# Patient Record
Sex: MALE | Race: OTHER | HISPANIC OR LATINO | Employment: FULL TIME | URBAN - METROPOLITAN AREA
[De-identification: names, ages, dates, MRNs, and addresses within clinical notes are randomized per-mention and may not be internally consistent; named-entity substitution may affect disease eponyms.]

---

## 2018-12-25 ENCOUNTER — HOSPITAL ENCOUNTER (EMERGENCY)
Facility: HOSPITAL | Age: 33
Discharge: HOME | End: 2018-12-25
Attending: EMERGENCY MEDICINE | Admitting: EMERGENCY MEDICINE

## 2018-12-25 VITALS
TEMPERATURE: 98.1 F | WEIGHT: 230 LBS | SYSTOLIC BLOOD PRESSURE: 139 MMHG | DIASTOLIC BLOOD PRESSURE: 93 MMHG | RESPIRATION RATE: 18 BRPM | HEART RATE: 88 BPM | OXYGEN SATURATION: 95 %

## 2018-12-25 DIAGNOSIS — K60.2 ANAL FISSURE: Primary | ICD-10-CM

## 2018-12-25 PROCEDURE — 99283 EMERGENCY DEPT VISIT LOW MDM: CPT

## 2018-12-25 RX ORDER — LIDOCAINE 40 MG/G
CREAM TOPICAL 3 TIMES DAILY
Qty: 30 G | Refills: 0 | Status: SHIPPED | OUTPATIENT
Start: 2018-12-25

## 2018-12-25 NOTE — ED PROVIDER NOTES
History  Chief Complaint   Patient presents with    Rectal Pain     Pt c/o rectal pain x1 month  Pt states worsening of pain x2 weeks, describing it is sharp pain  Pt states denies rectal bleeding  Pt states being constipated  29 yo male c/o rectal pain, particularly with sitting, and especially with bowel movements  Onset has been intermittently for several months, typically corresponds to hard to pass stools, and several months he was passing blood with hard stools in the underwear, on toilet paper, and dripping into toilet  Now pain has been more prominent, but bleeding has subsided for at least 1 month  He started using a fiber supplement which has improved passing stool, but still pain when sitting, and after BMs  No h/o anal trauma nor intercourse  History provided by:  Patient      None       No past medical history on file  Past Surgical History:   Procedure Laterality Date    ELBOW SURGERY Right     epicondylitis       No family history on file  I have reviewed and agree with the history as documented  Social History   Substance Use Topics    Smoking status: Current Some Day Smoker    Smokeless tobacco: Never Used      Comment: smokes hooka    Alcohol use Yes      Comment: occassional        Review of Systems   Constitutional: Negative for appetite change, chills and fever  HENT: Negative for sore throat  Respiratory: Negative for cough, shortness of breath and wheezing  Cardiovascular: Negative for chest pain and palpitations  Gastrointestinal: Positive for rectal pain  Negative for abdominal pain, diarrhea, nausea and vomiting  Genitourinary: Negative for dysuria and hematuria  Musculoskeletal: Negative for neck pain  Skin: Negative for rash  Neurological: Negative for dizziness, weakness and headaches  Psychiatric/Behavioral: Negative for suicidal ideas  All other systems reviewed and are negative        Physical Exam  Physical Exam   Constitutional: He is oriented to person, place, and time  He appears well-developed and well-nourished  HENT:   Head: Normocephalic and atraumatic  Right Ear: External ear normal    Left Ear: External ear normal    Nose: Nose normal    Mouth/Throat: Oropharynx is clear and moist    Eyes: Pupils are equal, round, and reactive to light  Conjunctivae and EOM are normal    Neck: Normal range of motion  Neck supple  Cardiovascular: Normal rate  Pulmonary/Chest: Effort normal    Abdominal: There is no tenderness  Genitourinary: Rectal exam shows fissure and tenderness  Rectal exam shows no external hemorrhoid and no internal hemorrhoid  Musculoskeletal: Normal range of motion  Neurological: He is alert and oriented to person, place, and time  No cranial nerve deficit  Coordination normal    Skin: Skin is warm and dry  Psychiatric: He has a normal mood and affect  His behavior is normal  Judgment and thought content normal    Nursing note and vitals reviewed        Vital Signs  ED Triage Vitals [12/25/18 1552]   Temp Pulse Respirations Blood Pressure SpO2   -- 88 18 139/93 95 %      Temp src Heart Rate Source Patient Position - Orthostatic VS BP Location FiO2 (%)   -- Monitor Sitting Right arm --      Pain Score       8           Vitals:    12/25/18 1552   BP: 139/93   Pulse: 88   Patient Position - Orthostatic VS: Sitting       Visual Acuity      ED Medications  Medications - No data to display    Diagnostic Studies  Results Reviewed     None                 No orders to display              Procedures  Procedures       Phone Contacts  ED Phone Contact    ED Course                               MDM  CritCare Time    Disposition  Final diagnoses:   Anal fissure     Time reflects when diagnosis was documented in both MDM as applicable and the Disposition within this note     Time User Action Codes Description Comment    12/25/2018  4:07 PM Ariella Dunham Add [K60 2] Anal fissure       ED Disposition     ED Disposition Condition Comment    Discharge  Arturo Mason discharge to home/self care  Condition at discharge: Good        Follow-up Information     Follow up With Specialties Details Why Contact Info    Jacqueline Dawn MD Colon and Rectal Surgery Go to For followup, If symptoms worsen [address]  [address]  [phone]            Patient's Medications   Discharge Prescriptions    HYDROCORTISONE (ANUSOL-HC, PROCTOSOL HC,) 2 5 % RECTAL CREAM    Apply small amount to affected area as needed for itching and pain       Start Date: 12/25/2018End Date: --       Order Dose: --       Quantity: 30 g    Refills: 0    LIDOCAINE (LMX) 4 % CREAM    Apply topically 3 (three) times a day       Start Date: 12/25/2018End Date: --       Order Dose: --       Quantity: 30 g    Refills: 0     No discharge procedures on file      ED Provider  Electronically Signed by           Yarelis Hdz MD  12/25/18 1327

## 2018-12-25 NOTE — DISCHARGE INSTRUCTIONS
Anal Fissure   WHAT YOU NEED TO KNOW:   An anal fissure is a cut or tear in the tissue inside your anus  An anal fissure may be acute or chronic  An acute anal fissure is usually small and shallow and often heals without treatment  A chronic fissure may last longer than a month and will usually require treatment  A chronic anal fissure comes back after treatment  DISCHARGE INSTRUCTIONS: Start a daily fiber supplement such as Citrucel or Metamucil and take it once or twice per day  Start a stool softener such as Colace  Drink plenty of water  The topical treatments include lidocaine and hydrocortisone and are for the pain and discomfort, but the fissure will begin to heal as your stools become easier to pass  Use tylenol and motrin, but stronger pain medicines causes constipation and will make the fissure and pain worse  Contact your healthcare provider if:   · You have a fever  · You are unable to have a bowel movement  · You have spasms in your anus that do not stop  · You have questions or concerns about your condition or care  Return to the emergency department if:   · You have very bad pain in or around your anus  · You have bleeding from your anus that does not stop

## 2019-02-15 ENCOUNTER — HOSPITAL ENCOUNTER (EMERGENCY)
Facility: HOSPITAL | Age: 34
Discharge: HOME | End: 2019-02-15

## 2019-02-15 VITALS
SYSTOLIC BLOOD PRESSURE: 176 MMHG | OXYGEN SATURATION: 100 % | TEMPERATURE: 97.8 F | WEIGHT: 230 LBS | HEART RATE: 75 BPM | RESPIRATION RATE: 18 BRPM | DIASTOLIC BLOOD PRESSURE: 85 MMHG

## 2019-02-15 DIAGNOSIS — K64.5 EXTERNAL HEMORRHOID, THROMBOSED: Primary | ICD-10-CM

## 2019-02-15 PROCEDURE — 99282 EMERGENCY DEPT VISIT SF MDM: CPT

## 2019-02-15 PROCEDURE — 96372 THER/PROPH/DIAG INJ SC/IM: CPT

## 2019-02-15 RX ORDER — DOCUSATE SODIUM 250 MG
250 CAPSULE ORAL DAILY
Qty: 10 CAPSULE | Refills: 0 | Status: SHIPPED | OUTPATIENT
Start: 2019-02-15

## 2019-02-15 RX ORDER — KETOROLAC TROMETHAMINE 30 MG/ML
15 INJECTION, SOLUTION INTRAMUSCULAR; INTRAVENOUS ONCE
Status: COMPLETED | OUTPATIENT
Start: 2019-02-15 | End: 2019-02-15

## 2019-02-15 RX ORDER — LIDOCAINE 40 MG/G
CREAM TOPICAL AS NEEDED
Qty: 30 G | Refills: 0 | Status: SHIPPED | OUTPATIENT
Start: 2019-02-15

## 2019-02-15 RX ADMIN — KETOROLAC TROMETHAMINE 15 MG: 30 INJECTION, SOLUTION INTRAMUSCULAR at 05:20

## 2019-02-15 NOTE — ED PROVIDER NOTES
History  Chief Complaint   Patient presents with    Hemorrhoids     pt reports dx with internal hemorrhoids two months ago, now pt states "i feel something there", pt reports a little bit of bright blood in toliet, normal BM     Pt is a 28year old male with a [facility] history of hemorrhoids presenting for rectal pain  Pt states the pain is 10/10 sharp pain in his anal/rectal area  He states he has been to the ED in the past for the same issue and was treated with supportive care  He states tonight he had worsening pain  He has hard irregular bowel movements that exacerbate his pain  Occasionally, he will have blood in his stool  States he has not had relief of symptomatic care at home  He denies fevers, abdominal pain, n/v/d, urinary symptoms  Prior to Admission Medications   Prescriptions Last Dose Informant Patient Reported? Taking?   hydrocortisone (ANUSOL-HC, PROCTOSOL HC,) 2 5 % rectal cream   No Yes   Sig: Apply small amount to affected area as needed for itching and pain   lidocaine (LMX) 4 % cream   No Yes   Sig: Apply topically 3 (three) times a day      Facility-Administered Medications: None       History reviewed  No pertinent past medical history  Past Surgical History:   Procedure Laterality Date    ELBOW SURGERY Right     epicondylitis       History reviewed  No pertinent family history  I have reviewed and agree with the history as documented  Social History     Tobacco Use    Smoking status: Current Some Day Smoker    Smokeless tobacco: Never Used    Tobacco comment: smokes hooka   Substance Use Topics    Alcohol use: Yes     Comment: occassional    Drug use: No        Review of Systems   Constitutional: Negative for activity change, appetite change, chills, diaphoresis, fatigue and fever  Respiratory: Negative for cough, chest tightness and shortness of breath  Cardiovascular: Negative for chest pain     Gastrointestinal: Positive for anal bleeding, blood in stool and rectal pain  Negative for abdominal distention, abdominal pain, constipation, diarrhea, nausea and vomiting  Genitourinary: Negative  Negative for decreased urine volume and difficulty urinating  Neurological: Negative for dizziness, weakness, light-headedness and headaches  Physical Exam  Physical Exam   Constitutional: He is oriented to person, place, and time  He appears well-developed and well-nourished  No distress  HENT:   Head: Normocephalic and atraumatic  Eyes: Conjunctivae and EOM are normal    Neck: Normal range of motion  Neck supple  Cardiovascular: Normal rate, regular rhythm, normal heart sounds and intact distal pulses  Pulmonary/Chest: Effort normal and breath sounds normal    Abdominal: Soft  Bowel sounds are normal  He exhibits no distension  There is no tenderness  Genitourinary: Rectal exam shows external hemorrhoid and tenderness  Rectal exam shows no internal hemorrhoid, no fissure, no mass and anal tone normal          Musculoskeletal: Normal range of motion  Neurological: He is alert and oriented to person, place, and time  Skin: Skin is warm and dry  Capillary refill takes less than 2 seconds  He is not diaphoretic         Vital Signs  ED Triage Vitals [02/15/19 0413]   Temperature Pulse Respirations Blood Pressure SpO2   97 8 °F (36 6 °C) 75 18 (!) 176/85 100 %      Temp src Heart Rate Source Patient Position - Orthostatic VS BP Location FiO2 (%)   -- -- -- -- --      Pain Score       7           Vitals:    02/15/19 0413   BP: (!) 176/85   Pulse: 75       Visual Acuity      ED Medications  Medications   ketorolac (TORADOL) injection 15 mg (15 mg Intramuscular Given 2/15/19 0520)       Diagnostic Studies  Results Reviewed     None                 No orders to display              Procedures  Procedures       Phone Contacts  ED Phone Contact    ED Course                               MDM  Number of Diagnoses or Management Options  External hemorrhoid, thrombosed:   Diagnosis management comments: Given Toradol in the ED for pain relief  Thrombosed hemorrhoid at 12 o'clock  Given supportive care at home with Colace  Follow up with colorectal surgery  Educated on return precautions  Stable and ready for surgery  Disposition  Final diagnoses:   External hemorrhoid, thrombosed     Time reflects when diagnosis was documented in both MDM as applicable and the Disposition within this note     Time User Action Codes Description Comment    2/15/2019  5:17 AM Ruth Vargasfrank Add [K64 5] External hemorrhoid, thrombosed       ED Disposition     ED Disposition Condition Date/Time Comment    Discharge Good Fri Feb 15, 2019  5:17 AM Efrain Anderson discharge to home/self care  Follow-up Information     Follow up With Specialties Details Why Contact Info    [employer]  Schedule an appointment as soon as possible for a visit  As needed [phone]            Discharge Medication List as of 2/15/2019  5:22 AM      START taking these medications    Details   docusate sodium (COLACE) 250 MG capsule Take 1 capsule (250 mg total) by mouth daily, Starting Fri 2/15/2019, Print      !! hydrocortisone (ANUSOL-HC) 2 5 % rectal cream Apply rectally 2 times daily, Print      !! lidocaine (LMX) 4 % cream Apply topically as needed for mild pain, Starting Fri 2/15/2019, Print       !! - Potential duplicate medications found  Please discuss with provider  CONTINUE these medications which have NOT CHANGED    Details   !! hydrocortisone (ANUSOL-HC, PROCTOSOL HC,) 2 5 % rectal cream Apply small amount to affected area as needed for itching and pain, Print      !! lidocaine (LMX) 4 % cream Apply topically 3 (three) times a day, Starting Tue 12/25/2018, Print       !! - Potential duplicate medications found  Please discuss with provider  No discharge procedures on file      ED Provider  Electronically Signed by           Alonso Dailey PA-C  02/15/19 8839

## 2020-10-21 ENCOUNTER — NURSE TRIAGE (OUTPATIENT)
Dept: OTHER | Facility: OTHER | Age: 35
End: 2020-10-21

## 2020-10-21 DIAGNOSIS — Z20.828 SARS-ASSOCIATED CORONAVIRUS EXPOSURE: Primary | ICD-10-CM

## 2020-10-22 ENCOUNTER — DOCUMENTATION (OUTPATIENT)
Dept: URGENT CARE | Age: 35
End: 2020-10-22

## 2020-10-22 DIAGNOSIS — Z20.828 SARS-ASSOCIATED CORONAVIRUS EXPOSURE: ICD-10-CM

## 2020-10-22 PROCEDURE — U0003 INFECTIOUS AGENT DETECTION BY NUCLEIC ACID (DNA OR RNA); SEVERE ACUTE RESPIRATORY SYNDROME CORONAVIRUS 2 (SARS-COV-2) (CORONAVIRUS DISEASE [COVID-19]), AMPLIFIED PROBE TECHNIQUE, MAKING USE OF HIGH THROUGHPUT TECHNOLOGIES AS DESCRIBED BY CMS-2020-01-R: HCPCS | Performed by: FAMILY MEDICINE

## 2020-10-23 LAB — SARS-COV-2 RNA SPEC QL NAA+PROBE: NOT DETECTED

## 2021-02-03 ENCOUNTER — NURSE TRIAGE (OUTPATIENT)
Dept: OTHER | Facility: OTHER | Age: 36
End: 2021-02-03

## 2021-02-03 DIAGNOSIS — Z20.828 EXPOSURE TO SARS-ASSOCIATED CORONAVIRUS: ICD-10-CM

## 2021-02-03 DIAGNOSIS — Z20.828 EXPOSURE TO SARS-ASSOCIATED CORONAVIRUS: Primary | ICD-10-CM

## 2021-02-03 PROCEDURE — U0003 INFECTIOUS AGENT DETECTION BY NUCLEIC ACID (DNA OR RNA); SEVERE ACUTE RESPIRATORY SYNDROME CORONAVIRUS 2 (SARS-COV-2) (CORONAVIRUS DISEASE [COVID-19]), AMPLIFIED PROBE TECHNIQUE, MAKING USE OF HIGH THROUGHPUT TECHNOLOGIES AS DESCRIBED BY CMS-2020-01-R: HCPCS | Performed by: FAMILY MEDICINE

## 2021-02-03 PROCEDURE — U0005 INFEC AGEN DETEC AMPLI PROBE: HCPCS | Performed by: FAMILY MEDICINE

## 2021-02-03 NOTE — TELEPHONE ENCOUNTER
Addendum: I closed the chart before adding the Covid swab  Had to go back in while patient was on the phone to enter the order

## 2021-02-03 NOTE — TELEPHONE ENCOUNTER
Reason for Disposition   [1] COVID-19 infection suspected by caller or triager AND [2] mild symptoms (cough, fever, or others) AND [8] no complications or SOB    Protocols used: CORONAVIRUS (COVID-19) [facility]AG[facility]

## 2021-02-03 NOTE — TELEPHONE ENCOUNTER
Regarding: COVID/SYMPTOMATIC/COUGHING  ----- Message from Haydee Puckett sent at 2/3/2021  9:25 AM EST -----  "I have partial loss of taste and smell, coughing, runny nose"

## 2021-02-03 NOTE — TELEPHONE ENCOUNTER
Patient does not have a PCP or insurance  Is not interested in PCP since he has no insurance  Is aware of the cost of the test and if he receives a bill, he will pay

## 2021-02-03 NOTE — TELEPHONE ENCOUNTER
1  Were you within 6 feet or less, for up to 15 minutes or more with a person that has a confirmed COVID-19 test? "Not that I know of "  2  What was the date of your exposure? Symptoms started 02/01/2021  3  Are you experiencing any symptoms attributed to the virus?  (Assess for SOB, cough, fever, difficulty breathing) Feels warm but no thermometer, slight, dry, infrequent cough, loss of smell and taste, Runny nose, nasal congestion, fatigue, and body aches, and diarrhea  4   HIGH RISK: Do you have any history heart or lung conditions, weakened immune system, diabetes, Asthma, CHF, HIV, COPD, Chemo, renal failure, sickle cell, etc? Dania

## 2021-02-04 ENCOUNTER — TELEPHONE (OUTPATIENT)
Dept: OTHER | Facility: OTHER | Age: 36
End: 2021-02-04

## 2021-02-04 LAB — SARS-COV-2 RNA RESP QL NAA+PROBE: POSITIVE

## 2021-02-04 NOTE — TELEPHONE ENCOUNTER
Second attempt: The patient was called for notification of a test result for COVID-19  The patient did not answer the phone and a voicemail was left requesting a call back to [phone], Option 7

## 2021-02-04 NOTE — TELEPHONE ENCOUNTER
The patient was called for notification of a test result for COVID-19  The patient did not answer the phone and a voicemail was left requesting a call back to [phone], Option 7

## 2021-02-04 NOTE — TELEPHONE ENCOUNTER
The patient was called for notification of a test result for COVID-19  The patient did not answer the phone and a voicemail was left requesting a call back to [phone], Option 7

## 2021-08-25 ENCOUNTER — OFFICE VISIT (OUTPATIENT)
Dept: GASTROENTEROLOGY | Facility: CLINIC | Age: 36
End: 2021-08-25

## 2021-08-25 VITALS
SYSTOLIC BLOOD PRESSURE: 127 MMHG | DIASTOLIC BLOOD PRESSURE: 80 MMHG | TEMPERATURE: 98.1 F | HEART RATE: 71 BPM | WEIGHT: 251 LBS

## 2021-08-25 DIAGNOSIS — K62.5 RECTAL BLEEDING: Primary | ICD-10-CM

## 2021-08-25 DIAGNOSIS — K59.09 OTHER CONSTIPATION: ICD-10-CM

## 2021-08-25 PROCEDURE — 99244 OFF/OP CNSLTJ NEW/EST MOD 40: CPT | Performed by: INTERNAL MEDICINE

## 2021-08-25 RX ORDER — POLYETHYLENE GLYCOL 3350 17 G/17G
255 POWDER, FOR SOLUTION ORAL ONCE
Qty: 255 G | Refills: 0 | Status: SHIPPED | OUTPATIENT
Start: 2021-08-25 | End: 2021-08-25

## 2021-08-25 NOTE — ASSESSMENT & PLAN NOTE
Strains to pass stools  Was taking Metamucil which was causing abdominal bloating  Likely exacerbating outlet bleeding as noted above    · Plan for colonoscopy as noted above  · Advised patient to discontinue Metamucil  · Start Benefiber which he can purchase over-the-counter with goal of 25-35 g of fiber daily   · MiraLax daily as needed   · Encouraged oral hydration

## 2021-08-25 NOTE — PATIENT INSTRUCTIONS
Stop Metamucil  Start taking Benefiber which can be purchased over-the-counter  Make sure to drink plenty of water each day for the constipation  You can also take MiraLax daily as needed for the constipation  We will schedule you for a colonoscopy  Make sure to do the bowel prep as directed      Colon on 9/23/21 with Dr Emily Andrew at [facility]  Miralax/dulcolax prep instructions given by Dairmilli Scarce

## 2021-08-25 NOTE — PROGRESS NOTES
[facility] - Outpatient Consultation  Bernadette Rivera 39 y o  male MRN: [id number]  Encounter: [id number]      ASSESSMENT AND PLAN:      Problem List Items Addressed This Visit        Digestive    Rectal bleeding - Primary     Likely outlet bleeding based on description probably hemorrhoidal   However could not definitively rule out other source of possible bleeding including diverticular versus neoplasm versus AVM  · Optimize management of constipation as noted below   · Plan for colonoscopy for further evaluation  · Bowel prep instructions provided as well as order for bowel prep         Relevant Medications    polyethylene glycol (MiraLax) 17 GM/SCOOP powder    bisacodyl (DULCOLAX) 5 mg EC tablet    Other Relevant Orders    Colonoscopy       Other    Other constipation     Strains to pass stools  Was taking Metamucil which was causing abdominal bloating  Likely exacerbating outlet bleeding as noted above  · Plan for colonoscopy as noted above  · Advised patient to discontinue Metamucil  · Start Benefiber which he can purchase over-the-counter with goal of 25-35 g of fiber daily   · MiraLax daily as needed   · Encouraged oral hydration          Relevant Medications    polyethylene glycol (MiraLax) 17 GM/SCOOP powder    bisacodyl (DULCOLAX) 5 mg EC tablet    Other Relevant Orders    Colonoscopy        ______________________________________________________________________    HPI:  79-year-old male presents to GI office for evaluation of rectal bleeding  Reports this is been going on intermittently for the last 2 years  Reports that he has episodes approximately 2 times per week  Describes it as bright red blood within the toilet bowl, mixed in with the stools  Sometimes with visible blood clot  He also sees it on upon this would tissue upon wiping  Denies any abdominal pain  He does have some constipation and reports straining to pass stools    He does take Metamucil which has been causing some bloating as well  Denies any other alarm symptoms including unintentional weight loss, diarrhea, change in bowel habits, abdominal pain  He denies any upper GI symptoms including dysphagia, odynophagia, nausea, vomiting  He has never had EGD or colonoscopy  Denies frequent NSAID use  Denies any family history of colon cancer  REVIEW OF SYSTEMS:    CONSTITUTIONAL: Denies any fever, chills, rigors, and weight loss  HEENT: No earache or tinnitus, denies hearing loss or visual disturbances  CARDIOVASCULAR: No chest pain or palpitations  RESPIRATORY: Denies any cough, hemoptysis, shortness of breath or dyspnea on exertion  GASTROINTESTINAL: As noted in the History of Present Illness  GENITOURINARY: No problems with urination, denies any hematuria or dysuria  NEUROLOGIC: No dizziness or vertigo, denies headaches   MUSCULOSKELETAL: Denies any muscle or joint pain   SKIN: Denies skin rashes or itching  ENDOCRINE: Denies excessive thirst, denies intolerance to heat or cold  PSYCHOSOCIAL: Denies depression or anxiety, denies any recent memory loss     Historical Information   History reviewed  No pertinent past medical history  Past Surgical History:   Procedure Laterality Date    ELBOW SURGERY Right     epicondylitis     Social History   Social History     Substance and Sexual Activity   Alcohol Use Yes    Comment: occassional     Social History     Substance and Sexual Activity   Drug Use No     Social History     Tobacco Use   Smoking Status Current Some Day Smoker   Smokeless Tobacco Never Used   Tobacco Comment    smokes hooka     History reviewed  No pertinent family history  Meds/Allergies   (Not in a hospital admission)    No current facility-administered medications for this visit  No Known Allergies      PHYSICAL EXAM:      Objective   Blood pressure 127/80, pulse 71, temperature 98 1 °F (36 7 °C), temperature source Tympanic, weight 114 kg (251 lb)   There is no height or weight on file to calculate BMI  General Appearance:   Alert, cooperative, no distress   HEENT:   Normocephalic, atraumatic, anicteric     Neck:   Supple, symmetrical, trachea midline   Lungs:   Clear to auscultation bilaterally; no rales, rhonchi or wheezing; respirations unlabored    Heart:   Regular rate and rhythm   Abdomen:   Soft, non-tender, non-distended; normal bowel sounds; no masses, no organomegaly    Genitalia:   Deferred    Rectal:   Deferred    Extremities:   No cyanosis, clubbing or edema    Pulses:   2+ and symmetric all extremities    Skin:   No jaundice, rashes, or lesions    Lymph Nodes:   No palpable cervical lymphadenopathy        LAB RESULTS:     No visits with results within 1 Day(s) from this visit  Latest known visit with results is:   Orders Only on 02/03/2021   Component Date Value    SARS-CoV-2 02/03/2021 Positive*       RADIOLOGY RESULTS: I have personally reviewed pertinent imaging studies  AARON Comer  Gastroenterology Fellow  [facility]  Available on [employer] Like  [email or web address]

## 2021-08-25 NOTE — ASSESSMENT & PLAN NOTE
Likely outlet bleeding based on description probably hemorrhoidal   However could not definitively rule out other source of possible bleeding including diverticular versus neoplasm versus AVM    · Optimize management of constipation as noted below   · Plan for colonoscopy for further evaluation  · Bowel prep instructions provided as well as order for bowel prep

## 2021-09-22 ENCOUNTER — TELEPHONE (OUTPATIENT)
Dept: GASTROENTEROLOGY | Facility: HOSPITAL | Age: 36
End: 2021-09-22

## 2023-03-11 ENCOUNTER — APPOINTMENT (EMERGENCY)
Dept: CT IMAGING | Facility: HOSPITAL | Age: 38
End: 2023-03-11

## 2023-03-11 ENCOUNTER — HOSPITAL ENCOUNTER (EMERGENCY)
Facility: HOSPITAL | Age: 38
Discharge: HOME | End: 2023-03-11
Attending: EMERGENCY MEDICINE

## 2023-03-11 VITALS
SYSTOLIC BLOOD PRESSURE: 139 MMHG | HEIGHT: 69 IN | OXYGEN SATURATION: 96 % | RESPIRATION RATE: 19 BRPM | WEIGHT: 256.84 LBS | DIASTOLIC BLOOD PRESSURE: 95 MMHG | HEART RATE: 73 BPM | BODY MASS INDEX: 38.04 KG/M2 | TEMPERATURE: 98.4 F

## 2023-03-11 DIAGNOSIS — R73.9 HYPERGLYCEMIA: ICD-10-CM

## 2023-03-11 DIAGNOSIS — R42 DIZZINESS: ICD-10-CM

## 2023-03-11 DIAGNOSIS — K76.0 FATTY LIVER: ICD-10-CM

## 2023-03-11 DIAGNOSIS — K42.9 UMBILICAL HERNIA: Primary | ICD-10-CM

## 2023-03-11 DIAGNOSIS — R10.9 ABDOMINAL PAIN: ICD-10-CM

## 2023-03-11 DIAGNOSIS — R55 SYNCOPE: ICD-10-CM

## 2023-03-11 DIAGNOSIS — R93.5 ABNORMAL CT OF THE ABDOMEN: ICD-10-CM

## 2023-03-11 LAB
ALBUMIN SERPL BCP-MCNC: 4.8 G/DL (ref 3.5–5)
ALP SERPL-CCNC: 86 U/L (ref 34–104)
ALT SERPL W P-5'-P-CCNC: 82 U/L (ref 7–52)
AMORPH URATE CRY URNS QL MICRO: ABNORMAL /HPF
ANION GAP SERPL CALCULATED.3IONS-SCNC: 8 MMOL/L (ref 4–13)
APTT PPP: 29 SECONDS (ref 23–37)
AST SERPL W P-5'-P-CCNC: 51 U/L (ref 13–39)
BACTERIA UR QL AUTO: ABNORMAL /HPF
BASOPHILS # BLD AUTO: 0.07 THOUSANDS/ÂΜL (ref 0–0.1)
BASOPHILS NFR BLD AUTO: 1 % (ref 0–1)
BILIRUB SERPL-MCNC: 0.76 MG/DL (ref 0.2–1)
BILIRUB UR QL STRIP: NEGATIVE
BUN SERPL-MCNC: 9 MG/DL (ref 5–25)
CALCIUM SERPL-MCNC: 10.1 MG/DL (ref 8.4–10.2)
CARDIAC TROPONIN I PNL SERPL HS: 2 NG/L
CHLORIDE SERPL-SCNC: 102 MMOL/L (ref 96–108)
CLARITY UR: CLEAR
CO2 SERPL-SCNC: 25 MMOL/L (ref 21–32)
COLOR UR: YELLOW
CREAT SERPL-MCNC: 0.95 MG/DL (ref 0.6–1.3)
EOSINOPHIL # BLD AUTO: 0.19 THOUSAND/ÂΜL (ref 0–0.61)
EOSINOPHIL NFR BLD AUTO: 2 % (ref 0–6)
ERYTHROCYTE [DISTWIDTH] IN BLOOD BY AUTOMATED COUNT: 13 % (ref 11.6–15.1)
EST. AVERAGE GLUCOSE BLD GHB EST-MCNC: 237 MG/DL
GFR SERPL CREATININE-BSD FRML MDRD: 101 ML/MIN/1.73SQ M
GLUCOSE SERPL-MCNC: 230 MG/DL (ref 65–140)
GLUCOSE UR STRIP-MCNC: NEGATIVE MG/DL
HBA1C MFR BLD: 9.9 %
HCT VFR BLD AUTO: 49.2 % (ref 36.5–49.3)
HGB BLD-MCNC: 16.3 G/DL (ref 12–17)
HGB UR QL STRIP.AUTO: NEGATIVE
IMM GRANULOCYTES # BLD AUTO: 0.04 THOUSAND/UL (ref 0–0.2)
IMM GRANULOCYTES NFR BLD AUTO: 0 % (ref 0–2)
INR PPP: 1.11 (ref 0.84–1.19)
KETONES UR STRIP-MCNC: NEGATIVE MG/DL
LEUKOCYTE ESTERASE UR QL STRIP: NEGATIVE
LIPASE SERPL-CCNC: 11 U/L (ref 11–82)
LYMPHOCYTES # BLD AUTO: 2.06 THOUSANDS/ÂΜL (ref 0.6–4.47)
LYMPHOCYTES NFR BLD AUTO: 17 % (ref 14–44)
MCH RBC QN AUTO: 30 PG (ref 26.8–34.3)
MCHC RBC AUTO-ENTMCNC: 33.1 G/DL (ref 31.4–37.4)
MCV RBC AUTO: 90 FL (ref 82–98)
MONOCYTES # BLD AUTO: 0.83 THOUSAND/ÂΜL (ref 0.17–1.22)
MONOCYTES NFR BLD AUTO: 7 % (ref 4–12)
NEUTROPHILS # BLD AUTO: 8.76 THOUSANDS/ÂΜL (ref 1.85–7.62)
NEUTS SEG NFR BLD AUTO: 73 % (ref 43–75)
NITRITE UR QL STRIP: NEGATIVE
NON-SQ EPI CELLS URNS QL MICRO: ABNORMAL /HPF
NRBC BLD AUTO-RTO: 0 /100 WBCS
PH UR STRIP.AUTO: 5.5 [PH] (ref 4.5–8)
PLATELET # BLD AUTO: 238 THOUSANDS/UL (ref 149–390)
PMV BLD AUTO: 9 FL (ref 8.9–12.7)
POTASSIUM SERPL-SCNC: 4.3 MMOL/L (ref 3.5–5.3)
PROT SERPL-MCNC: 8 G/DL (ref 6.4–8.4)
PROT UR STRIP-MCNC: ABNORMAL MG/DL
PROTHROMBIN TIME: 14.3 SECONDS (ref 11.6–14.5)
RBC # BLD AUTO: 5.44 MILLION/UL (ref 3.88–5.62)
RBC #/AREA URNS AUTO: ABNORMAL /HPF
SODIUM SERPL-SCNC: 135 MMOL/L (ref 135–147)
SP GR UR STRIP.AUTO: >=1.03 (ref 1–1.03)
UROBILINOGEN UR QL STRIP.AUTO: 0.2 E.U./DL
WBC # BLD AUTO: 11.95 THOUSAND/UL (ref 4.31–10.16)
WBC #/AREA URNS AUTO: ABNORMAL /HPF

## 2023-03-11 RX ORDER — MECLIZINE HYDROCHLORIDE 25 MG/1
25 TABLET ORAL ONCE
Status: COMPLETED | OUTPATIENT
Start: 2023-03-11 | End: 2023-03-11

## 2023-03-11 RX ORDER — MECLIZINE HYDROCHLORIDE 25 MG/1
25 TABLET ORAL 3 TIMES DAILY PRN
Qty: 30 TABLET | Refills: 0 | Status: SHIPPED | OUTPATIENT
Start: 2023-03-11

## 2023-03-11 RX ADMIN — MECLIZINE HYDROCHLORIDE 25 MG: 25 TABLET ORAL at 11:31

## 2023-03-11 RX ADMIN — SODIUM CHLORIDE 1000 ML: 0.9 INJECTION, SOLUTION INTRAVENOUS at 11:27

## 2023-03-11 RX ADMIN — IOHEXOL 100 ML: 350 INJECTION, SOLUTION INTRAVENOUS at 12:22

## 2023-03-11 NOTE — DISCHARGE INSTRUCTIONS
FU with the family doctor for eval of your elevated glucose - you may be diabetic  - reduce sugar/starches in your diet  FU with surgery for the umbilical hernia  If you develop severe pain return to the ED for re evaluation of your appendix  Meclizine as needed for dizziness

## 2023-03-11 NOTE — ED PROVIDER NOTES
History  Chief Complaint   Patient presents with   • Abdominal Pain     C/o RLQ pain ongoing for months  Denies n/v/d  Also c/o dizziness worse when he stands from sitting position  Patient presents emergency department with bilateral lower abdominal pain that has been intermittent for several months  He also has umbilical that he states he has a hard time reducing  Patient states he also has been having some intermittent dizziness with movement  He states is positional in nature  He states that it can happen when he rolls over in bed or turns his head and also can happen if he stands up and moves  He states if he is sitting or lying still he does not have the dizziness  He describes it as a brief room spinning dizziness  Patient ultimately came into the emergency department because yesterday he had gotten up and started walking and became very dizzy and then he had a syncopal episode and fell to the ground  No headache or head injury  No chest pain or trouble breathing  Prior to Admission Medications   Prescriptions Last Dose Informant Patient Reported?  Taking?   bisacodyl (DULCOLAX) 5 mg EC tablet   No No   Sig: Take 4 tablets (20 mg total) by mouth once for 1 dose   docusate sodium (COLACE) 250 MG capsule   No No   Sig: Take 1 capsule (250 mg total) by mouth daily   Patient not taking: Reported on 8/25/2021   hydrocortisone (ANUSOL-HC) 2 5 % rectal cream   No No   Sig: Apply rectally 2 times daily   Patient not taking: Reported on 8/25/2021   hydrocortisone (ANUSOL-HC, PROCTOSOL HC,) 2 5 % rectal cream   No No   Sig: Apply small amount to affected area as needed for itching and pain   Patient not taking: Reported on 8/25/2021   lidocaine (LMX) 4 % cream   No No   Sig: Apply topically 3 (three) times a day   Patient not taking: Reported on 8/25/2021   lidocaine (LMX) 4 % cream   No No   Sig: Apply topically as needed for mild pain   Patient not taking: Reported on 8/25/2021   polyethylene glycol (MiraLax) 17 GM/SCOOP powder   No No   Sig: Take 255 g by mouth once for 1 dose      Facility-Administered Medications: None       History reviewed  No pertinent past medical history  Past Surgical History:   Procedure Laterality Date   • ELBOW SURGERY Right     epicondylitis       History reviewed  No pertinent family history  I have reviewed and agree with the history as documented  E-Cigarette/Vaping   • E-Cigarette Use Never User      E-Cigarette/Vaping Substances   • Nicotine No    • THC No    • CBD No    • Flavoring No    • Other No    • Unknown No      Social History     Tobacco Use   • Smoking status: Some Days   • Smokeless tobacco: Never   • Tobacco comments:     smokes hooka   Vaping Use   • Vaping Use: Never used   Substance Use Topics   • Alcohol use: Yes     Comment: occassional   • Drug use: No       Review of Systems   Constitutional: Negative for chills and fever  Respiratory: Negative  Cardiovascular: Negative  Gastrointestinal: Positive for abdominal pain and blood in stool  Negative for diarrhea, nausea and vomiting  Genitourinary: Positive for frequency  Musculoskeletal: Positive for back pain  Neurological: Positive for dizziness, syncope and light-headedness  Negative for seizures, weakness and numbness  All other systems reviewed and are negative  Physical Exam  Physical Exam  Vitals and nursing note reviewed  Constitutional:       Appearance: He is well-developed  HENT:      Head: Normocephalic and atraumatic  Right Ear: Tympanic membrane, ear canal and external ear normal       Left Ear: Tympanic membrane, ear canal and external ear normal    Eyes:      Conjunctiva/sclera: Conjunctivae normal    Cardiovascular:      Rate and Rhythm: Normal rate and regular rhythm  Heart sounds: Normal heart sounds  Pulmonary:      Effort: Pulmonary effort is normal       Breath sounds: Normal breath sounds     Abdominal:      General: Bowel sounds are normal       Palpations: Abdomen is soft  Tenderness: There is abdominal tenderness in the right lower quadrant, periumbilical area, suprapubic area and left lower quadrant  There is no right CVA tenderness or left CVA tenderness  Comments: Patient has an umbilical hernia hernia has pain with palpation and difficult to reduce   Musculoskeletal:         General: Normal range of motion  Cervical back: Normal range of motion and neck supple  Lymphadenopathy:      Cervical: No cervical adenopathy  Skin:     General: Skin is warm  Findings: No rash  Neurological:      Mental Status: He is alert and oriented to person, place, and time  Cranial Nerves: No cranial nerve deficit  Sensory: Sensation is intact  Motor: No weakness or abnormal muscle tone  Coordination: Romberg sign negative   Coordination normal  Finger-Nose-Finger Test normal       Comments: Patient   Psychiatric:         Behavior: Behavior normal          Vital Signs  ED Triage Vitals [03/11/23 1054]   Temperature Pulse Respirations Blood Pressure SpO2   98 4 °F (36 9 °C) 99 19 128/82 96 %      Temp Source Heart Rate Source Patient Position - Orthostatic VS BP Location FiO2 (%)   Oral Monitor Sitting Right arm --      Pain Score       4           Vitals:    03/11/23 1131 03/11/23 1133 03/11/23 1135 03/11/23 1415   BP: 132/95 130/87 139/95    Pulse: 85 87 98 73   Patient Position - Orthostatic VS: Lying - Orthostatic VS Sitting - Orthostatic VS Standing - Orthostatic VS          Visual Acuity      ED Medications  Medications   sodium chloride 0 9 % bolus 1,000 mL (0 mL Intravenous Stopped 3/11/23 1227)   meclizine (ANTIVERT) tablet 25 mg (25 mg Oral Given 3/11/23 1131)   iohexol (OMNIPAQUE) 350 MG/ML injection (SINGLE-DOSE) 100 mL (100 mL Intravenous Given 3/11/23 1222)       Diagnostic Studies  Results Reviewed     Procedure Component Value Units Date/Time    Hemoglobin A1C [[id number]] Collected: 03/11/23 1129 Lab Status:  In process Specimen: Blood from Arm, Right Updated: 03/11/23 1306    Urine Microscopic [[id number]]  (Abnormal) Collected: 03/11/23 1142    Lab Status: Final result Specimen: Urine, Clean Catch Updated: 03/11/23 1235     RBC, UA None Seen /hpf      WBC, UA None Seen /hpf      Epithelial Cells Occasional /hpf      Bacteria, UA       Field obscured, unable to enumerate     /hpf     AMORPH URATES Innumerable /hpf     HS Troponin 0hr (reflex protocol) [[id number]]  (Normal) Collected: 03/11/23 1129    Lab Status: Final result Specimen: Blood from Arm, Right Updated: 03/11/23 1211     hs TnI 0hr 2 ng/L     Comprehensive metabolic panel [[id number]]  (Abnormal) Collected: 03/11/23 1129    Lab Status: Final result Specimen: Blood from Arm, Right Updated: 03/11/23 1202     Sodium 135 mmol/L      Potassium 4 3 mmol/L      Chloride 102 mmol/L      CO2 25 mmol/L      ANION GAP 8 mmol/L      BUN 9 mg/dL      Creatinine 0 95 mg/dL      Glucose 230 mg/dL      Calcium 10 1 mg/dL      AST 51 U/L      ALT 82 U/L      Alkaline Phosphatase 86 U/L      Total Protein 8 0 g/dL      Albumin 4 8 g/dL      Total Bilirubin 0 76 mg/dL      eGFR 101 ml/min/1 73sq m     Narrative:      [facility] guidelines for Chronic Kidney Disease (CKD):   •  Stage 1 with normal or high GFR (GFR > 90 mL/min/1 73 square meters)  •  Stage 2 Mild CKD (GFR = 60-89 mL/min/1 73 square meters)  •  Stage 3A Moderate CKD (GFR = 45-59 mL/min/1 73 square meters)  •  Stage 3B Moderate CKD (GFR = 30-44 mL/min/1 73 square meters)  •  Stage 4 Severe CKD (GFR = 15-29 mL/min/1 73 square meters)  •  Stage 5 End Stage CKD (GFR <15 mL/min/1 73 square meters)  Note: GFR calculation is accurate only with a steady state creatinine    Lipase [[id number]]  (Normal) Collected: 03/11/23 1129    Lab Status: Final result Specimen: Blood from Arm, Right Updated: 03/11/23 1202     Lipase 11 u/L     Protime-INR [[id number]]  (Normal) Collected: 03/11/23 1129 Lab Status: Final result Specimen: Blood from Arm, Right Updated: 03/11/23 1153     Protime 14 3 seconds      INR 1 11    APTT [[id number]]  (Normal) Collected: 03/11/23 1129    Lab Status: Final result Specimen: Blood from Arm, Right Updated: 03/11/23 1153     PTT 29 seconds     Chlamydia/GC amplified DNA by PCR [[id number]] Collected: 03/11/23 1138    Lab Status: In process Specimen: Urine, Other Updated: 03/11/23 1148    Urine Macroscopic, POC [[id number]]  (Abnormal) Collected: 03/11/23 1142    Lab Status: Final result Specimen: Urine Updated: 03/11/23 1143     Color, UA Yellow     Clarity, UA Clear     pH, UA 5 5     Leukocytes, UA Negative     Nitrite, UA Negative     Protein,  (2+) mg/dl      Glucose, UA Negative mg/dl      Ketones, UA Negative mg/dl      Urobilinogen, UA 0 2 E U /dl      Bilirubin, UA Negative     Occult Blood, UA Negative     Specific Gravity, UA >=1 030    Narrative:      CLINITEK RESULT    CBC and differential [[id number]]  (Abnormal) Collected: 03/11/23 1129    Lab Status: Final result Specimen: Blood from Arm, Right Updated: 03/11/23 1134     WBC 11 95 Thousand/uL      RBC 5 44 Million/uL      Hemoglobin 16 3 g/dL      Hematocrit 49 2 %      MCV 90 fL      MCH 30 0 pg      MCHC 33 1 g/dL      RDW 13 0 %      MPV 9 0 fL      Platelets 603 Thousands/uL      nRBC 0 /100 WBCs      Neutrophils Relative 73 %      Immat GRANS % 0 %      Lymphocytes Relative 17 %      Monocytes Relative 7 %      Eosinophils Relative 2 %      Basophils Relative 1 %      Neutrophils Absolute 8 76 Thousands/µL      Immature Grans Absolute 0 04 Thousand/uL      Lymphocytes Absolute 2 06 Thousands/µL      Monocytes Absolute 0 83 Thousand/µL      Eosinophils Absolute 0 19 Thousand/µL      Basophils Absolute 0 07 Thousands/µL                  CT abdomen pelvis with contrast   Final Result by Velasquez Gregory MD (03/11 1257)      Prominent periumbilical hernia of fat  Fatty infiltration of liver        Left colon diverticulosis without CT evidence of diverticulitis  The appendix is borderline in caliber measuring 7 mm (axial image 147) however no surrounding inflammatory change is identified  This finding should be correlated with any significant right lower quadrant tenderness as early appendicitis is difficult to entirely exclude given the thickening  Interval follow-up may be helpful if symptoms should persist or worsen  This was discussed with REBECA Santos at 12:50 PM         Workstation performed: [id number]                    Procedures  ECG 12 Lead Documentation Only    Date/Time: 3/11/2023 11:45 AM  Performed by: Javi Seymour PA-C  Authorized by: Javi Seymour PA-C     Indications / Diagnosis:  Dizziness  ECG reviewed by me, the ED Provider: yes    Patient location:  ED  Previous ECG:     Previous ECG:  Compared to current    Comparison ECG info:  April 29, 2011    Similarity:  Changes noted    Comparison to cardiac monitor: Yes (no arrythmia seen while in ED)    Rate:     ECG rate:  81    ECG rate assessment: normal    Rhythm:     Rhythm: sinus rhythm    Ectopy:     Ectopy: none    QRS:     QRS axis:  Normal  Conduction:     Conduction: normal    ST segments:     ST segments:  Normal  T waves:     T waves: inverted      Inverted:  III  Comments:      discussed EKG and H+P with DR WEBER'S Penning[place]              ED Course  ED Course as of 03/11/23 1516   Sat Mar 11, 2023   1136 WBC(!): 11 95  Min  Elivated  No anemia - pt reports Hemorid and sometimes seeing blood - not causing anemia    1215 hs TnI 0hr: 2  negative   1220 Glucose, Random(!): 230  Elevated    1220 AST(!): 51   1220 ALT(!): 82  Mildly elevated - no old to compare to    1225 Compared labs 2016- [facility] - CMP glucose - not fasting was 144, LFTs all with in normal range  1248 Had lengthy discussion with patient and family about his elevated sugar  Patient has no history of being diabetic    He admits that he is having a lot of sugary beverages  Discussed extensively about diet and exercise  7566 + umbilical hernia - enlarged fatty liver,   Mildly thickened appendix  - discussed with radiology - will reach out to surgery for eval    1304 Discussed with Surgery - they also reviewed CT and will come see pt  Discussed Ct results with pt     1443 Surgery saw pt - ok to dc home and FU as out pt for further eval of the hernia, feel this is what is causing his int pain and feel his appendix is normal               HEART Risk Score    Flowsheet Row Most Recent Value   Heart Score Risk Calculator    History 0 Filed at: 03/11/2023 1215   ECG 1 Filed at: 03/11/2023 1215   Age 0 Filed at: 03/11/2023 1215   Risk Factors 1 Filed at: 03/11/2023 1215   Troponin 0 Filed at: 03/11/2023 1215   HEART Score 2 Filed at: 03/11/2023 1215                        SBIRT 20yo+    Flowsheet Row Most Recent Value   SBIRT (23 yo +)    In order to provide better care to our patients, we are screening all of our patients for alcohol and drug use  Would it be okay to ask you these screening questions? No Filed at: 03/11/2023 1138                    Medical Decision Making  + dizziness - suspect vertiginous - however - had syncopal episode will eval for cardiac cause, getting EKG to eval for arrhythmias   Troponin negative, low HEART Score - ok to FU out pt    + abdoinal pain with hernia - will get CT for eval - also RLQ pain - concern for appendicitis    - surgery saw pt  Discussed + Hyperglycemia - concern for DM and importance of FU -no PCP - referral placed    Abdominal pain: acute illness or injury  Abnormal CT of the abdomen: acute illness or injury  Dizziness: acute illness or injury  Fatty liver: acute illness or injury  Hyperglycemia: acute illness or injury  Syncope: acute illness or injury  Umbilical hernia: acute illness or injury  Amount and/or Complexity of Data Reviewed  Independent Historian: spouse     Details: helped to provide additional details of hx   External Data Reviewed: labs, radiology and notes  Details: compared to prior immaging, labs, and reviewed PMH  Labs: ordered  Decision-making details documented in ED Course  Radiology: ordered  ECG/medicine tests: ordered and independent interpretation performed  Details: discussed with DR Marylene Mallet   Discussion of management or test interpretation with external provider(s): Surgery saw patient patient is okay to follow-up as an outpatient for the umbilical hernia  Suspect this is the source of his abdominal pain  They do not feel that he has appendicitis and is okay to go home  Discussed this with patient and reasons to return to the emergency department  Ambulatory referral placed to family practice as he has no family doctor and patient may be diabetic  Discussed diet and exercise with patient discussed importance of follow-up  Hemoglobin A1c test is still pending  Risk  OTC drugs  Prescription drug management  Decision regarding hospitalization  Diagnosis or treatment significantly limited by social determinants of health  Emergency major surgery            Disposition  Final diagnoses:   Umbilical hernia   Abnormal CT of the abdomen - enlarged appendix   Abdominal pain   Fatty liver   Hyperglycemia   Dizziness   Syncope     Time reflects when diagnosis was documented in both MDM as applicable and the Disposition within this note     Time User Action Codes Description Comment    3/11/2023 12:58 PM Vonnie Bell [N36 8] Umbilical hernia     6/38/5824 12:58 PM Vonnie Bell [R93 5] Abnormal CT of the abdomen     3/11/2023  1:05 PM Vonnie Bell [R10 9] Abdominal pain     3/11/2023  1:05 PM Vonnie Bell Modify [R93 5] Abnormal CT of the abdomen enlarged appendix    3/11/2023  1:06 PM Vonnie Bell [K76 0] Fatty liver     3/11/2023  1:06 PM Vonnie Bell [R73 9] Hyperglycemia     3/11/2023  2:46 PM Vonnie Bell [R42] Dizziness     3/11/2023  2:46 PM Arabella Vonnie Add [R55] Syncope       ED Disposition     ED Disposition   Discharge    Condition   Stable    Date/Time   Sat Mar 11, 2023  2:46 PM    Comment   Salina Hollingsworth discharge to home/self care  Follow-up Information     Follow up With Specialties Details Why Contact Info    Dave Retana MD General Surgery, Wound Care Follow up in 1 week(s) Call to schedule to discuss elective umbilical hernia repair [address] [place] [address]      [facility], [address] [facility] Otolaryngology   [address]      Jeremy Code    [phone]            Discharge Medication List as of 3/11/2023  2:49 PM      CONTINUE these medications which have NOT CHANGED    Details   bisacodyl (DULCOLAX) 5 mg EC tablet Take 4 tablets (20 mg total) by mouth once for 1 dose, Starting Wed 8/25/2021, Normal      docusate sodium (COLACE) 250 MG capsule Take 1 capsule (250 mg total) by mouth daily, Starting Fri 2/15/2019, Print      !! hydrocortisone (ANUSOL-HC) 2 5 % rectal cream Apply rectally 2 times daily, Print      !! hydrocortisone (ANUSOL-HC, PROCTOSOL HC,) 2 5 % rectal cream Apply small amount to affected area as needed for itching and pain, Print      !! lidocaine (LMX) 4 % cream Apply topically 3 (three) times a day, Starting Tue 12/25/2018, Print      !! lidocaine (LMX) 4 % cream Apply topically as needed for mild pain, Starting Fri 2/15/2019, Print      polyethylene glycol (MiraLax) 17 GM/SCOOP powder Take 255 g by mouth once for 1 dose, Starting Wed 8/25/2021, Normal       !! - Potential duplicate medications found  Please discuss with provider                PDMP Review     None          ED Provider  Electronically Signed by           Elysia Retana PA-C  03/11/23 0615

## 2023-03-11 NOTE — CONSULTS
Consultation - General Surgery   Smooth Leung 44 y o  male MRN: [id number]  Unit/Bed#: ED-10 Encounter: [id number]    Assessment/Plan     Assessment:  37M w/ reducible umbilical hernia and surgical c/s to rule out appendicitis    AVSS  CT reviewed, fat containing umbilical hernia  Additionally appendix measure 7mm, there is no thickening, stranding, or additional evidence of appendicitis  There is no appendicolith and gas is present w/in the appencieal lumen    Plan:  -No evidence of acute appendicitis clinically or radiographically  Pt reports a 2 month history of vague abdominal pain  Originally presented for syncope  -I believe his history of vague abdominal pain is related to his fat containing reducible umbilical hernia  Would have him follow up as an outpatient for elective repair  -Return precautions given including migration of pain to RLQ, fevers, nausea, evidence of bowel obstruction  Patient understood and is in agreement    History of Present Illness     HPI:  Smooth Leung is a 44 y o  male who originally presented to the ER for evaluation of dizziness and syncope  He did report a multiple month history of vague intermittent abdominal pain which prompted further evaluation  Surgery was asked to evaluate for appendicitis  Patient states pain is intermittent, yesterday he had abdominal pain, but better today  Denies any fevers, chills, nausea, or vomiting  Has been tolerating a diet and having normal bowel function  Does have a reducible umbilical hernia that states has been present for approximately a year  He states that the pain started around the same time he noticed the hernia  He takes alieve for the pain with improvement  Is a , does not do heavy lifting  No previous abdominal surgeries  Consult to surgery general  Consult performed by:  Nelia Valentino DO  Consult ordered by: Kristi Kline PA-C          Review of Systems   Constitutional: Negative for appetite change, chills and fever  HENT: Negative  Eyes: Negative  Respiratory: Negative  Cardiovascular: Negative  Gastrointestinal: Positive for abdominal pain  Negative for constipation, diarrhea, nausea and vomiting  Endocrine: Negative  Genitourinary: Negative  Musculoskeletal: Negative  Skin: Negative  Allergic/Immunologic: Negative  Neurological: Positive for dizziness and syncope  Hematological: Negative  Psychiatric/Behavioral: Negative  Historical Information   History reviewed  No pertinent past medical history  Past Surgical History:   Procedure Laterality Date   • ELBOW SURGERY Right     epicondylitis     Social History   Social History     Substance and Sexual Activity   Alcohol Use Yes    Comment: occassional     Social History     Substance and Sexual Activity   Drug Use No     E-Cigarette/Vaping   • E-Cigarette Use Never User      E-Cigarette/Vaping Substances   • Nicotine No    • THC No    • CBD No    • Flavoring No    • Other No    • Unknown No      Social History     Tobacco Use   Smoking Status Some Days   Smokeless Tobacco Never   Tobacco Comments    smokes hooka     Family History: History reviewed  No pertinent family history      Meds/Allergies   all current active meds have been reviewed  No Known Allergies    Objective   First Vitals:   Blood Pressure: 128/82 (03/11/23 1054)  Pulse: 99 (03/11/23 1054)  Temperature: 98 4 °F (36 9 °C) (03/11/23 1054)  Temp Source: Oral (03/11/23 1054)  Respirations: 19 (03/11/23 1054)  Height: 5' 9" (175 3 cm) (03/11/23 1054)  Weight - Scale: 116 kg (256 lb 13 4 oz) (03/11/23 1054)  SpO2: 96 % (03/11/23 1054)    Current Vitals:   Blood Pressure: 139/95 (03/11/23 1135)  Pulse: 73 (03/11/23 1415)  Temperature: 98 4 °F (36 9 °C) (03/11/23 1054)  Temp Source: Oral (03/11/23 1054)  Respirations: 19 (03/11/23 1415)  Height: 5' 9" (175 3 cm) (03/11/23 1054)  Weight - Scale: 116 kg (256 lb 13 4 oz) (03/11/23 1054)  SpO2: 96 % (03/11/23 1415)    No intake or output data in the 24 hours ending 03/11/23 1439    Invasive Devices     Peripheral Intravenous Line  Duration           Peripheral IV 03/11/23 Distal;Right;Upper;Ventral (anterior) Arm <1 day                Physical Exam  General: NAD, obese  HENT: NCAT MMM  Neck: supple, no JVD  CV: nl rate  Lungs: nl wob  No resp distress  ABD: Soft, mild b/l lower abdominal pain, no pain at mcburney's point, Reducible umbilical hernia, nondistended  Extrem: No CCE  Neuro: AAOx3    Lab Results:   I have personally reviewed pertinent lab results    , CBC:   Lab Results   Component Value Date    WBC 11 95 (H) 03/11/2023    HGB 16 3 03/11/2023    HCT 49 2 03/11/2023    MCV 90 03/11/2023     03/11/2023    MCH 30 0 03/11/2023    MCHC 33 1 03/11/2023    RDW 13 0 03/11/2023    MPV 9 0 03/11/2023    NRBC 0 03/11/2023   , CMP:   Lab Results   Component Value Date    SODIUM 135 03/11/2023    K 4 3 03/11/2023     03/11/2023    CO2 25 03/11/2023    BUN 9 03/11/2023    CREATININE 0 95 03/11/2023    CALCIUM 10 1 03/11/2023    AST 51 (H) 03/11/2023    ALT 82 (H) 03/11/2023    ALKPHOS 86 03/11/2023    EGFR 101 03/11/2023   , Coagulation:   Lab Results   Component Value Date    INR 1 11 03/11/2023     Imaging: I have personally reviewed pertinent films in PACS  EKG, Pathology, and Other Studies: I have personally reviewed pertinent films in PACS

## 2023-03-12 LAB
ATRIAL RATE: 81 BPM
P AXIS: 26 DEGREES
PR INTERVAL: 150 MS
QRS AXIS: 68 DEGREES
QRSD INTERVAL: 94 MS
QT INTERVAL: 340 MS
QTC INTERVAL: 394 MS
T WAVE AXIS: 5 DEGREES
VENTRICULAR RATE: 81 BPM

## 2023-03-13 LAB
C TRACH DNA SPEC QL NAA+PROBE: NEGATIVE
N GONORRHOEA DNA SPEC QL NAA+PROBE: NEGATIVE

## 2025-03-09 ENCOUNTER — APPOINTMENT (EMERGENCY)
Dept: CT IMAGING | Facility: HOSPITAL | Age: 40
End: 2025-03-09
Payer: COMMERCIAL

## 2025-03-09 ENCOUNTER — HOSPITAL ENCOUNTER (EMERGENCY)
Facility: HOSPITAL | Age: 40
Discharge: HOME | End: 2025-03-09
Attending: EMERGENCY MEDICINE | Admitting: EMERGENCY MEDICINE
Payer: COMMERCIAL

## 2025-03-09 VITALS
HEART RATE: 74 BPM | SYSTOLIC BLOOD PRESSURE: 128 MMHG | TEMPERATURE: 98.2 F | OXYGEN SATURATION: 97 % | BODY MASS INDEX: 33.92 KG/M2 | DIASTOLIC BLOOD PRESSURE: 76 MMHG | RESPIRATION RATE: 16 BRPM | WEIGHT: 229.72 LBS

## 2025-03-09 DIAGNOSIS — K42.9 UMBILICAL HERNIA: Primary | ICD-10-CM

## 2025-03-09 LAB
ALBUMIN SERPL BCG-MCNC: 5 G/DL (ref 3.5–5)
ALP SERPL-CCNC: 64 U/L (ref 34–104)
ALT SERPL W P-5'-P-CCNC: 32 U/L (ref 7–52)
ANION GAP SERPL CALCULATED.3IONS-SCNC: 9 MMOL/L (ref 4–13)
AST SERPL W P-5'-P-CCNC: 18 U/L (ref 13–39)
BASOPHILS # BLD AUTO: 0.04 THOUSANDS/ÂΜL (ref 0–0.1)
BASOPHILS NFR BLD AUTO: 1 % (ref 0–1)
BILIRUB SERPL-MCNC: 0.51 MG/DL (ref 0.2–1)
BUN SERPL-MCNC: 11 MG/DL (ref 5–25)
CALCIUM SERPL-MCNC: 9.6 MG/DL (ref 8.4–10.2)
CHLORIDE SERPL-SCNC: 104 MMOL/L (ref 96–108)
CO2 SERPL-SCNC: 24 MMOL/L (ref 21–32)
CREAT SERPL-MCNC: 0.99 MG/DL (ref 0.6–1.3)
EOSINOPHIL # BLD AUTO: 0.09 THOUSAND/ÂΜL (ref 0–0.61)
EOSINOPHIL NFR BLD AUTO: 1 % (ref 0–6)
ERYTHROCYTE [DISTWIDTH] IN BLOOD BY AUTOMATED COUNT: 12.3 % (ref 11.6–15.1)
GFR SERPL CREATININE-BSD FRML MDRD: 94 ML/MIN/1.73SQ M
GLUCOSE SERPL-MCNC: 127 MG/DL (ref 65–140)
HCT VFR BLD AUTO: 48.8 % (ref 36.5–49.3)
HGB BLD-MCNC: 16.3 G/DL (ref 12–17)
IMM GRANULOCYTES # BLD AUTO: 0.02 THOUSAND/UL (ref 0–0.2)
IMM GRANULOCYTES NFR BLD AUTO: 0 % (ref 0–2)
LACTATE SERPL-SCNC: 0.8 MMOL/L (ref 0.5–2)
LIPASE SERPL-CCNC: 19 U/L (ref 11–82)
LYMPHOCYTES # BLD AUTO: 2.09 THOUSANDS/ÂΜL (ref 0.6–4.47)
LYMPHOCYTES NFR BLD AUTO: 24 % (ref 14–44)
MCH RBC QN AUTO: 30.6 PG (ref 26.8–34.3)
MCHC RBC AUTO-ENTMCNC: 33.4 G/DL (ref 31.4–37.4)
MCV RBC AUTO: 92 FL (ref 82–98)
MONOCYTES # BLD AUTO: 0.63 THOUSAND/ÂΜL (ref 0.17–1.22)
MONOCYTES NFR BLD AUTO: 7 % (ref 4–12)
NEUTROPHILS # BLD AUTO: 5.93 THOUSANDS/ÂΜL (ref 1.85–7.62)
NEUTS SEG NFR BLD AUTO: 67 % (ref 43–75)
NRBC BLD AUTO-RTO: 0 /100 WBCS
PLATELET # BLD AUTO: 226 THOUSANDS/UL (ref 149–390)
PMV BLD AUTO: 8.9 FL (ref 8.9–12.7)
POTASSIUM SERPL-SCNC: 4.2 MMOL/L (ref 3.5–5.3)
PROT SERPL-MCNC: 8.1 G/DL (ref 6.4–8.4)
RBC # BLD AUTO: 5.33 MILLION/UL (ref 3.88–5.62)
SODIUM SERPL-SCNC: 137 MMOL/L (ref 135–147)
WBC # BLD AUTO: 8.8 THOUSAND/UL (ref 4.31–10.16)

## 2025-03-09 PROCEDURE — 96375 TX/PRO/DX INJ NEW DRUG ADDON: CPT

## 2025-03-09 PROCEDURE — 85025 COMPLETE CBC W/AUTO DIFF WBC: CPT

## 2025-03-09 PROCEDURE — 99284 EMERGENCY DEPT VISIT MOD MDM: CPT

## 2025-03-09 PROCEDURE — 74177 CT ABD & PELVIS W/CONTRAST: CPT

## 2025-03-09 PROCEDURE — 96365 THER/PROPH/DIAG IV INF INIT: CPT

## 2025-03-09 PROCEDURE — 83690 ASSAY OF LIPASE: CPT

## 2025-03-09 PROCEDURE — 36415 COLL VENOUS BLD VENIPUNCTURE: CPT

## 2025-03-09 PROCEDURE — 96361 HYDRATE IV INFUSION ADD-ON: CPT

## 2025-03-09 PROCEDURE — 80053 COMPREHEN METABOLIC PANEL: CPT

## 2025-03-09 PROCEDURE — 99285 EMERGENCY DEPT VISIT HI MDM: CPT

## 2025-03-09 PROCEDURE — 83605 ASSAY OF LACTIC ACID: CPT

## 2025-03-09 PROCEDURE — 99243 OFF/OP CNSLTJ NEW/EST LOW 30: CPT | Performed by: SURGERY

## 2025-03-09 RX ORDER — HYDROMORPHONE HCL/PF 1 MG/ML
0.5 SYRINGE (ML) INJECTION ONCE
Status: COMPLETED | OUTPATIENT
Start: 2025-03-09 | End: 2025-03-09

## 2025-03-09 RX ORDER — ACETAMINOPHEN 10 MG/ML
1000 INJECTION, SOLUTION INTRAVENOUS ONCE
Status: COMPLETED | OUTPATIENT
Start: 2025-03-09 | End: 2025-03-09

## 2025-03-09 RX ORDER — OXYCODONE HYDROCHLORIDE 5 MG/1
5 TABLET ORAL EVERY 4 HOURS PRN
Qty: 10 TABLET | Refills: 0 | Status: SHIPPED | OUTPATIENT
Start: 2025-03-09 | End: 2025-03-19

## 2025-03-09 RX ADMIN — SODIUM CHLORIDE 1000 ML: 0.9 INJECTION, SOLUTION INTRAVENOUS at 11:24

## 2025-03-09 RX ADMIN — IOHEXOL 100 ML: 350 INJECTION, SOLUTION INTRAVENOUS at 12:25

## 2025-03-09 RX ADMIN — HYDROMORPHONE HYDROCHLORIDE 0.5 MG: 1 INJECTION, SOLUTION INTRAMUSCULAR; INTRAVENOUS; SUBCUTANEOUS at 12:40

## 2025-03-09 RX ADMIN — ACETAMINOPHEN 1000 MG: 10 INJECTION INTRAVENOUS at 11:24

## 2025-03-09 NOTE — CONSULTS
Consultation - Surgery-General   Name: Eliud Castro 39 y.o. male I MRN: [id number]  Unit/Bed#: ED-10 I Date of Admission: 3/9/2025   Date of Service: 3/9/2025 I Hospital Day: 0   Consults  Physician Requesting Evaluation: Rita Maciel DO   Reason for Evaluation / Principal Problem: Umbilical hernia    Assessment & Plan  Umbilical hernia  40 yo male presents with periumbilical abdominal pain most likely in the setting of a fat containing umbilical hernia    Plan  Umbilical hernia successfully reduced in the ED  Patient had immediate relief of symptoms  PO challenge in the ED  Continue ice packs of umbilicus  If tolerates PO challenge, safe for discharge from a general surgery perspective  PRN pain meds and anti nausea meds  On discharge, needs to followup with general surgery in 1-2 weeks  Provide abdominal binder on discharge  Please reach out to general surgery role with any additional questions or concerns      History of Present Illness   Eliud Castro is a 39 y.o. male who presents with abdominal pain. The patient states he has had the abdominal pain for the past 3 days. Denies having nausea, vomiting, fevers, chills, chest pain, shortness of breath. Tolerating PO intake. Voiding without difficulty. Still having bowel movements and passing flatus. PMHX includes an umbilical hernia in the past 2 years+. No PSHX. On presentation to the ED the patient was afebrile with stable vitals. Labs normal. 3/9 CT AP with contrast demonstrated a fat containing umbilical hernia. Hernia was reduced in the ED. See above for assessment and plan:    Review of Systems  See above, otherwise negative    Objective :  Temp:  [98.2 °F (36.8 °C)] 98.2 °F (36.8 °C)  HR:  [74-88] 74  BP: (122-128)/(75-76) 128/76  Resp:  [16-18] 16  SpO2:  [96 %-97 %] 97 %  O2 Device: None (Room air)      Physical Exam    Physical Exam:  General: No acute distress, alert and oriented  Neuro: alert, oriented x3  HENT: PERRL, EOMI  CV: Well perfused,  regular rate and rhythm  Lungs: Normal work of breathing, no increased respiratory effort  Abdomen: Soft, tender to palpation around umbilicus, nondistended. Erythema present. Fascial edges palpated after reduction of the hernia.  MSK/Extremities: No edema, clubbing or cyanosis  Skin: Warm, dry      Lab Results: I have reviewed the following results:  Recent Labs     03/09/25  1123   WBC 8.80   HGB 16.3   HCT 48.8      SODIUM 137   K 4.2      CO2 24   BUN 11   CREATININE 0.99   GLUC 127   AST 18   ALT 32   ALB 5.0   TBILI 0.51   ALKPHOS 64   LACTICACID 0.8           VTE Pharmacologic Prophylaxis: none  VTE Mechanical Prophylaxis: none

## 2025-03-09 NOTE — Clinical Note
Eliud Castro was seen and treated in our emergency department on 3/9/2025.                Diagnosis: Umbilical hernia    Eliud  may return to work on return date.    He may return on this date:     Patient advised not to lift any objects over the next 2-week at work greater than 5 pounds.     If you have any questions or concerns, please don't hesitate to call.      Michael Wagner PA-C    ______________________________           _______________          _______________  Hospital Representative                              Date                                Time

## 2025-03-09 NOTE — ASSESSMENT & PLAN NOTE
40 yo male presents with periumbilical abdominal pain most likely in the setting of a fat containing umbilical hernia    Plan  Umbilical hernia successfully reduced in the ED  Patient had immediate relief of symptoms  PO challenge in the ED  Continue ice packs of umbilicus  If tolerates PO challenge, safe for discharge from a general surgery perspective  PRN pain meds and anti nausea meds  On discharge, needs to followup with general surgery in 1-2 weeks  Provide abdominal binder on discharge  Please reach out to general surgery role with any additional questions or concerns

## 2025-03-09 NOTE — DISCHARGE INSTRUCTIONS
Patient has follow-up general surgery in 2 weeks.  Ambulatory referral placed.  Patient advised to continue wearing abdominal binder.  If you notice any changes to the hernia area you should return to the emergency department.  Do not drive while taking oxycodone as a sedating medication.  Supportive treatment ibuprofen Tylenol per OTC dosing recommendations.     Patient was advised to return to the ED with any worsening symptoms that were explained on discharge including but not limited to chest pain, shortness of breath, irretractable vomiting or diarrhea, vision loss, loss of function, loss of sensation, syncope, hemoptysis, hematochezia, hematemesis, melena, decreased oral intake, feeling ill.

## 2025-03-09 NOTE — ED PROVIDER NOTES
Time reflects when diagnosis was documented in both MDM as applicable and the Disposition within this note       Time User Action Codes Description Comment    3/9/2025  1:54 PM Michael Wagner Add [K42.9] Umbilical hernia           ED Disposition       ED Disposition   Discharge    Condition   Stable    Date/Time   Sun Mar 9, 2025  1:53 PM    Comment   Eliudirish Castro discharge to home/self care.                   Assessment & Plan       Medical Decision Making  39-year-old male presenting ED chief complaint of umbilical pain.  Known umbilical hernia.  Stated over the past 3-day worsening pain.  There is overlying induration to the area.  Patient vital signs are unremarkable.  Patient does have tenderness to palpation in the area bowel sounds normal otherwise abdominal exam is benign.  Cardiopulmonary exam is benign.  Baseline labs ordered showing no acute abnormalities.  CT scan ordered showingImpression:       Since March 2023 there is unchanged size of fat-containing umbilical hernia. New fat stranding within the hernia sac and adjacent subcutaneous tissues may indicate incarceration. No bowel involvement of the hernia.    Periumbilical subcutaneous fat stranding adjacent to the hernia site may reflect cellulitis. No abscess.    Normal appendix and gallbladder.    Scattered colonic diverticulosis without evidence of diverticulitis.    Surgery was consulted and reduce the hernia.  The patient was placed in an abdominal binder.  Patient given follow-up with general surgery.  Patient was given strict return to ED protocol with any worsening symptoms.  I explained what to watch out for with his hernia and what would warrant return to ED.  Disposition was explained to follow-ups.  Pain medication sent to pharmacy.  Advised to avoid lifting any heavy objects over the next 2 weeks until follow-up with surgery.    Patient understood diagnosis and treatment plan and had no further questions.  Patient was discharged in  "stable condition.  Patient was advised to follow-up with  PCP in 1 to 2 days.  Patient was advised to return to the ED with any worsening symptoms that were explained on discharge including but not limited to chest pain, shortness of breath, irretractable vomiting or diarrhea, vision loss, loss of function, loss of sensation, syncope, hemoptysis, hematochezia, hematemesis, melena, decreased oral intake, feeling ill.     Ddx-umbilical hernia, incarcerated hernia, strangulated hernia, bowel obstruction, cellulitis    Portions of the record may have been created with voice recognition software. Occasional wrong word or \"sound a like\" substitutions may have occurred due to the inherent limitations of voice recognition software. Read the chart carefully and recognize, using context, where substitutions have occurred.        Amount and/or Complexity of Data Reviewed  Labs: ordered.     Details: See MDM  Radiology: ordered.     Details: See MDM  Discussion of management or test interpretation with external provider(s): General surgery-official consult made.  Surgery reduce gave follow-up information for outpatient.    Risk  Prescription drug management.  Risk Details: Risk of worsening symptoms along with signs and symptoms worsening symptoms were thoroughly explained on discharge.  Risk of incomplete follow-up was discussed.  Patient had full understanding of all risks had no further questions and was discharged in stable condition.              Medications   sodium chloride 0.9 % bolus 1,000 mL (0 mL Intravenous Stopped 3/9/25 1350)   acetaminophen (Ofirmev) injection 1,000 mg (0 mg Intravenous Stopped 3/9/25 1154)   iohexol (OMNIPAQUE) 350 MG/ML injection (MULTI-DOSE) 100 mL (100 mL Intravenous Given 3/9/25 1225)   HYDROmorphone (DILAUDID) injection 0.5 mg (0.5 mg Intravenous Given 3/9/25 1240)       ED Risk Strat Scores                            SBIRT 20yo+      Flowsheet Row Most Recent Value   Initial Alcohol " Screen: US AUDIT-C     1. How often do you have a drink containing alcohol? 0 Filed at: 03/09/2025 1152   2. How many drinks containing alcohol do you have on a typical day you are drinking?  0 Filed at: 03/09/2025 1155   3a. Male UNDER 65: How often do you have five or more drinks on one occasion? 0 Filed at: 03/09/2025 1155   3b. FEMALE Any Age, or MALE 65+: How often do you have 4 or more drinks on one occassion? 0 Filed at: 03/09/2025 1154   Audit-C Score 0 Filed at: 03/09/2025 115   ANTHONY: How many times in the past year have you...    Used an illegal drug or used a prescription medication for non-medical reasons? Never Filed at: 03/09/2025 9755                            History of Present Illness       Chief Complaint   Patient presents with    Abdominal Pain     Pt c/o pain from umbilical hernia for the past x 3 days. Pt states he has had the hernia for the past x 2 years. Pt denies any known event to trigger the pain. Pt denies cp/sob/n/v/d or fevers       History reviewed. No pertinent past medical history.   Past Surgical History:   Procedure Laterality Date    ELBOW SURGERY Right     epicondylitis      History reviewed. No pertinent family history.   Social History     Tobacco Use    Smoking status: Former     Types: Cigarettes    Smokeless tobacco: Never    Tobacco comments:     smokes hooka   Vaping Use    Vaping status: Former   Substance Use Topics    Alcohol use: Yes     Comment: occassional    Drug use: No      E-Cigarette/Vaping    E-Cigarette Use Former User       E-Cigarette/Vaping Substances    Nicotine No     THC No     CBD No     Flavoring No     Other No     Unknown No       I have reviewed and agree with the history as documented.     3-year-old male presenting ED with a chief complaint of umbilical pain.  Significant past medical history of umbilical hernia.  Patient stated around 3 days ago he started noticing pain around the umbilicus area.  Yesterday started noticed some redness to  where the pain is increased into today.  He denies taking medication for coming to ED.  He does endorse redness and swelling around the umbilical area.  Endorses normal bowel movements.  Denies any chills fevers diaphoresis.  Denies any nausea vomiting diarrhea.  Patient stated that he is unsure when he might of reinjured the hernia but he does have pain with any pushing or lifting.  Stated that he also can feel the pain radiating into his groin area when this is occurring.  He denies any testicular swelling or any abnormal lie of the testicles.  Endorses normal urinary habits.Patient denies any chest pain, shortness of breath, vomiting, diarrhea, chills, diaphoresis, fevers, loss of consciousness, syncope, urinary and bowel changes, abdominal pain, visual symptoms, vision loss, loss of function, loss of sensation, decreased oral intake, hemoptysis, hematochezia, hematemesis, melena, confusion.         Review of Systems   Constitutional:  Negative for activity change, appetite change, chills, diaphoresis, fatigue and fever.   HENT:  Negative for congestion, ear discharge, ear pain, postnasal drip, rhinorrhea, sinus pressure, sinus pain and sore throat.    Eyes:  Negative for photophobia and visual disturbance.   Respiratory:  Negative for cough, chest tightness and shortness of breath.    Cardiovascular:  Negative for chest pain and palpitations.   Gastrointestinal:  Positive for abdominal pain. Negative for abdominal distention, constipation, diarrhea, nausea and vomiting.   Genitourinary:  Negative for difficulty urinating, dysuria, flank pain, frequency and hematuria.   Musculoskeletal:  Negative for arthralgias, back pain, joint swelling, myalgias, neck pain and neck stiffness.   Skin:  Negative for rash and wound.   Neurological:  Negative for dizziness, tremors, syncope, facial asymmetry, weakness, light-headedness, numbness and headaches.           Objective       ED Triage Vitals [03/09/25 1027]    Temperature Pulse Blood Pressure Respirations SpO2 Patient Position - Orthostatic VS   98.2 °F (36.8 °C) 88 122/75 18 96 % Sitting      Temp Source Heart Rate Source BP Location FiO2 (%) Pain Score    Oral Monitor Right arm -- 5      Vitals      Date and Time Temp Pulse SpO2 Resp BP Pain Score FACES Pain Rating User   03/09/25 1240 -- -- -- -- -- 6 --    03/09/25 1235 -- 74 97 % 16 128/76 -- --    03/09/25 1027 98.2 °F (36.8 °C) 88 96 % 18 122/75 5 -- [facility]            Physical Exam  Vitals and nursing note reviewed.   Constitutional:       General: He is not in acute distress.     Appearance: Normal appearance. He is normal weight. He is not ill-appearing, toxic-appearing or diaphoretic.   HENT:      Head: Normocephalic.      Right Ear: External ear normal.      Left Ear: External ear normal.      Nose: Nose normal.      Mouth/Throat:      Mouth: Mucous membranes are moist.   Eyes:      Conjunctiva/sclera: Conjunctivae normal.   Cardiovascular:      Rate and Rhythm: Normal rate and regular rhythm.      Pulses: Normal pulses.   Pulmonary:      Effort: Pulmonary effort is normal. No respiratory distress.      Breath sounds: Normal breath sounds. No stridor. No wheezing, rhonchi or rales.   Chest:      Chest wall: No tenderness.   Abdominal:      General: Bowel sounds are normal.      Palpations: Abdomen is soft.      Tenderness: There is abdominal tenderness. There is no right CVA tenderness or left CVA tenderness.      Hernia: A hernia is present.      Comments: Umbilical hernia noted on exam.  There is inflammation noted and erythema in this area.  Tenderness around the lower aspect of the umbilicus.  The area is indurated.  Will CT to further evaluate.   Musculoskeletal:         General: No tenderness. Normal range of motion.      Cervical back: Normal range of motion and neck supple. No rigidity or tenderness.   Lymphadenopathy:      Cervical: No cervical adenopathy.   Skin:     General: Skin is warm and dry.       Capillary Refill: Capillary refill takes less than 2 seconds.      Findings: No rash.   Neurological:      Mental Status: He is alert and oriented to person, place, and time.   Psychiatric:         Mood and Affect: Mood normal.         Results Reviewed       Procedure Component Value Units Date/Time    CBC and differential [[id number]] Collected: 03/09/25 1123    Lab Status: Final result Specimen: Blood from Arm, Right Updated: 03/09/25 1233     WBC 8.80 Thousand/uL      RBC 5.33 Million/uL      Hemoglobin 16.3 g/dL      Hematocrit 48.8 %      MCV 92 fL      MCH 30.6 pg      MCHC 33.4 g/dL      RDW 12.3 %      MPV 8.9 fL      Platelets 226 Thousands/uL      nRBC 0 /100 WBCs      Segmented % 67 %      Immature Grans % 0 %      Lymphocytes % 24 %      Monocytes % 7 %      Eosinophils Relative 1 %      Basophils Relative 1 %      Absolute Neutrophils 5.93 Thousands/µL      Absolute Immature Grans 0.02 Thousand/uL      Absolute Lymphocytes 2.09 Thousands/µL      Absolute Monocytes 0.63 Thousand/µL      Eosinophils Absolute 0.09 Thousand/µL      Basophils Absolute 0.04 Thousands/µL     Lactic acid, plasma (w/reflex if result > 2.0) [[id number]]  (Normal) Collected: 03/09/25 1123    Lab Status: Final result Specimen: Blood from Arm, Right Updated: 03/09/25 1156     LACTIC ACID 0.8 mmol/L     Narrative:      Result may be elevated if tourniquet was used during collection.    Comprehensive metabolic panel [[id number]] Collected: 03/09/25 1123    Lab Status: Final result Specimen: Blood from Arm, Right Updated: 03/09/25 1156     Sodium 137 mmol/L      Potassium 4.2 mmol/L      Chloride 104 mmol/L      CO2 24 mmol/L      ANION GAP 9 mmol/L      BUN 11 mg/dL      Creatinine 0.99 mg/dL      Glucose 127 mg/dL      Calcium 9.6 mg/dL      AST 18 U/L      ALT 32 U/L      Alkaline Phosphatase 64 U/L      Total Protein 8.1 g/dL      Albumin 5.0 g/dL      Total Bilirubin 0.51 mg/dL      eGFR 94 ml/min/1.73sq m     Narrative:       [employer] guidelines for Chronic Kidney Disease (CKD):     Stage 1 with normal or high GFR (GFR > 90 mL/min/1.73 square meters)    Stage 2 Mild CKD (GFR = 60-89 mL/min/1.73 square meters)    Stage 3A Moderate CKD (GFR = 45-59 mL/min/1.73 square meters)    Stage 3B Moderate CKD (GFR = 30-44 mL/min/1.73 square meters)    Stage 4 Severe CKD (GFR = 15-29 mL/min/1.73 square meters)    Stage 5 End Stage CKD (GFR <15 mL/min/1.73 square meters)  Note: GFR calculation is accurate only with a steady state creatinine    Lipase [[id number]]  (Normal) Collected: 03/09/25 1123    Lab Status: Final result Specimen: Blood from Arm, Right Updated: 03/09/25 1156     Lipase 19 u/L             CT abdomen pelvis with contrast   Final Interpretation by Adriel Portillo MD (03/09 1322)      Since March 2023 there is unchanged size of fat-containing umbilical hernia. New fat stranding within the hernia sac and adjacent subcutaneous tissues may indicate incarceration. No bowel involvement of the hernia.      Periumbilical subcutaneous fat stranding adjacent to the hernia site may reflect cellulitis. No abscess.      Normal appendix and gallbladder.      Scattered colonic diverticulosis without evidence of diverticulitis.      The study was marked in EPIC for immediate notification.         Workstation performed: [id number]             Procedures    ED Medication and Procedure Management   Prior to Admission Medications   Prescriptions Last Dose Informant Patient Reported? Taking?   bisacodyl (DULCOLAX) 5 mg EC tablet   No No   Sig: Take 4 tablets (20 mg total) by mouth once for 1 dose   docusate sodium (COLACE) 250 MG capsule  Self No No   Sig: Take 1 capsule (250 mg total) by mouth daily   Patient not taking: Reported on 8/25/2021   hydrocortisone (ANUSOL-HC) 2.5 % rectal cream  Self No No   Sig: Apply rectally 2 times daily   Patient not taking: Reported on 8/25/2021   hydrocortisone (ANUSOL-HC, PROCTOSOL HC,) 2.5  % rectal cream  Self No No   Sig: Apply small amount to affected area as needed for itching and pain   Patient not taking: Reported on 8/25/2021   lidocaine (LMX) 4 % cream  Self No No   Sig: Apply topically 3 (three) times a day   Patient not taking: Reported on 8/25/2021   lidocaine (LMX) 4 % cream  Self No No   Sig: Apply topically as needed for mild pain   Patient not taking: Reported on 8/25/2021   meclizine (ANTIVERT) 25 mg tablet   No No   Sig: Take 1 tablet (25 mg total) by mouth 3 (three) times a day as needed for dizziness   polyethylene glycol (MiraLax) 17 GM/SCOOP powder   No No   Sig: Take 255 g by mouth once for 1 dose      Facility-Administered Medications: None     Discharge Medication List as of 3/9/2025  2:00 PM        START taking these medications    Details   oxyCODONE (Roxicodone) 5 immediate release tablet Take 1 tablet (5 mg total) by mouth every 4 (four) hours as needed for moderate pain for up to 10 days Max Daily Amount: 30 mg, Starting Sun 3/9/2025, Until Wed 3/19/2025 at 2359, Normal           CONTINUE these medications which have NOT CHANGED    Details   bisacodyl (DULCOLAX) 5 mg EC tablet Take 4 tablets (20 mg total) by mouth once for 1 dose, Starting Wed 8/25/2021, Normal      docusate sodium (COLACE) 250 MG capsule Take 1 capsule (250 mg total) by mouth daily, Starting Fri 2/15/2019, Print      !! hydrocortisone (ANUSOL-HC) 2.5 % rectal cream Apply rectally 2 times daily, Print      !! hydrocortisone (ANUSOL-HC, PROCTOSOL HC,) 2.5 % rectal cream Apply small amount to affected area as needed for itching and pain, Print      !! lidocaine (LMX) 4 % cream Apply topically 3 (three) times a day, Starting Tue 12/25/2018, Print      !! lidocaine (LMX) 4 % cream Apply topically as needed for mild pain, Starting Fri 2/15/2019, Print      meclizine (ANTIVERT) 25 mg tablet Take 1 tablet (25 mg total) by mouth 3 (three) times a day as needed for dizziness, Starting Sat 3/11/2023, Normal       polyethylene glycol (MiraLax) 17 GM/SCOOP powder Take 255 g by mouth once for 1 dose, Starting Wed 8/25/2021, Normal       !! - Potential duplicate medications found. Please discuss with provider.          ED SEPSIS DOCUMENTATION   Time reflects when diagnosis was documented in both MDM as applicable and the Disposition within this note       Time User Action Codes Description Comment    3/9/2025  1:54 PM Michael Wagner Add [K42.9] Umbilical hernia                  Michael Wagner PA-C  03/09/25 7096

## 2025-03-13 ENCOUNTER — CONSULT (OUTPATIENT)
Dept: SURGERY | Facility: CLINIC | Age: 40
End: 2025-03-13
Payer: COMMERCIAL

## 2025-03-13 VITALS
HEART RATE: 87 BPM | OXYGEN SATURATION: 96 % | HEIGHT: 69 IN | DIASTOLIC BLOOD PRESSURE: 78 MMHG | WEIGHT: 230 LBS | SYSTOLIC BLOOD PRESSURE: 112 MMHG | TEMPERATURE: 97.6 F | BODY MASS INDEX: 34.07 KG/M2 | RESPIRATION RATE: 16 BRPM

## 2025-03-13 DIAGNOSIS — K42.9 UMBILICAL HERNIA: ICD-10-CM

## 2025-03-13 PROCEDURE — 99204 OFFICE O/P NEW MOD 45 MIN: CPT | Performed by: STUDENT IN AN ORGANIZED HEALTH CARE EDUCATION/TRAINING PROGRAM

## 2025-03-13 RX ORDER — SODIUM CHLORIDE, SODIUM LACTATE, POTASSIUM CHLORIDE, CALCIUM CHLORIDE 600; 310; 30; 20 MG/100ML; MG/100ML; MG/100ML; MG/100ML
125 INJECTION, SOLUTION INTRAVENOUS CONTINUOUS
OUTPATIENT
Start: 2025-04-02

## 2025-03-13 RX ORDER — TIRZEPATIDE 5 MG/.5ML
INJECTION, SOLUTION SUBCUTANEOUS
COMMUNITY
Start: 2025-03-04

## 2025-03-13 NOTE — ASSESSMENT & PLAN NOTE
Patient with umbilical hernia he went to ER where it was reduced. He is doing ok today. Still having occasional pain but tolerating diet and having normal bowel movements. I reviewed his imaging and he appears to have 3 x 2 cm hernia with incarcerated preperitoneal fat/omentum.     Discussed various treatment options and he wishes to proceed with robotic repair hernia with bioresorbable mesh. Will schedule for surgery soon.    Orders:    Ambulatory Referral to General Surgery    Diet NPO; Sips with meds; Standing    Apply Sequential Compression Device; Standing    Place sequential compression device; Standing    Case request operating room: REPAIR HERNIA UMBILICAL LAPAROSCOPIC W/ ROBOTICS; Standing

## 2025-03-13 NOTE — PROGRESS NOTES
Name: Eliud Castro      : 1985      MRN: [id number]  Encounter Provider: Michelet Simeon DO  Encounter Date: 3/13/2025   Encounter department: [facility] GENERAL SURGERY [place]  :  Assessment & Plan  Umbilical hernia  Patient with umbilical hernia he went to ER where it was reduced. He is doing ok today. Still having occasional pain but tolerating diet and having normal bowel movements. I reviewed his imaging and he appears to have 3 x 2 cm hernia with incarcerated preperitoneal fat/omentum.     Discussed various treatment options and he wishes to proceed with robotic repair hernia with bioresorbable mesh. Will schedule for surgery soon.    Orders:    Ambulatory Referral to General Surgery    Diet NPO; Sips with meds; Standing    Apply Sequential Compression Device; Standing    Place sequential compression device; Standing    Case request operating room: REPAIR HERNIA UMBILICAL LAPAROSCOPIC W/ ROBOTICS; Standing        History of Present Illness   Eliud Castro is a 39 y.o. male who presents hernia  Patient presents with:  Hernia: Patient is here today regarding a Umbilical Hernia,Has noticed bulging over 2 years.Red and painful Painful to [facility] back in.Lifting, coughing, going from a sitting position to a standing position is painful. Standing long periods of time causes pain.          Review of Systems   Constitutional:  Negative for chills and fever.   HENT:  Negative for congestion.    Eyes:  Negative for visual disturbance.   Respiratory:  Negative for shortness of breath.    Cardiovascular:  Negative for chest pain.   Gastrointestinal:  Positive for abdominal pain. Negative for abdominal distention, constipation, diarrhea, nausea and vomiting.   Musculoskeletal:  Negative for back pain.   Skin:  Negative for rash.   Neurological:  Negative for dizziness.   Hematological:  Does not bruise/bleed easily.   Psychiatric/Behavioral:  The patient is not nervous/anxious.    All other systems  reviewed and are negative.   as per HPI.  Medical History Reviewed by provider this encounter:  Tobacco  Allergies  Meds  Problems  Med Hx  Surg Hx  Fam Hx     .  Past Medical History   History reviewed. No pertinent past medical history.  Past Surgical History:   Procedure Laterality Date    ELBOW SURGERY Right     epicondylitis     History reviewed. No pertinent family history.   reports that he has quit smoking. His smoking use included cigarettes. He has never been exposed to tobacco smoke. He has never used smokeless tobacco. He reports current alcohol use. He reports that he does not use drugs.  Current Outpatient Medications   Medication Instructions    oxyCODONE (ROXICODONE) 5 mg, Oral, Every 4 hours PRN    Zepbound 5 MG/0.5ML auto-injector 1 SUBCUTANEOUSLY ONCE A WEEK   No Known Allergies   Current Outpatient Medications on File Prior to Visit   Medication Sig Dispense Refill    oxyCODONE (Roxicodone) 5 immediate release tablet Take 1 tablet (5 mg total) by mouth every 4 (four) hours as needed for moderate pain for up to 10 days Max Daily Amount: 30 mg 10 tablet 0    Zepbound 5 MG/0.5ML auto-injector 1 SUBCUTANEOUSLY ONCE A WEEK      [DISCONTINUED] bisacodyl (DULCOLAX) 5 mg EC tablet Take 4 tablets (20 mg total) by mouth once for 1 dose 4 tablet 0    [DISCONTINUED] docusate sodium (COLACE) 250 MG capsule Take 1 capsule (250 mg total) by mouth daily (Patient not taking: Reported on 8/25/2021) 10 capsule 0    [DISCONTINUED] hydrocortisone (ANUSOL-HC) 2.5 % rectal cream Apply rectally 2 times daily (Patient not taking: Reported on 8/25/2021) 28.35 g 0    [DISCONTINUED] hydrocortisone (ANUSOL-HC, PROCTOSOL HC,) 2.5 % rectal cream Apply small amount to affected area as needed for itching and pain (Patient not taking: Reported on 8/25/2021) 30 g 0    [DISCONTINUED] lidocaine (LMX) 4 % cream Apply topically 3 (three) times a day (Patient not taking: Reported on 8/25/2021) 30 g 0    [DISCONTINUED] lidocaine  "(LMX) 4 % cream Apply topically as needed for mild pain (Patient not taking: Reported on 8/25/2021) 30 g 0    [DISCONTINUED] meclizine (ANTIVERT) 25 mg tablet Take 1 tablet (25 mg total) by mouth 3 (three) times a day as needed for dizziness 30 tablet 0    [DISCONTINUED] polyethylene glycol (MiraLax) 17 GM/SCOOP powder Take 255 g by mouth once for 1 dose 255 g 0     No current facility-administered medications on file prior to visit.      Social History     Tobacco Use    Smoking status: Former     Types: Cigarettes     Passive exposure: Never    Smokeless tobacco: Never    Tobacco comments:     smokes hooka   Vaping Use    Vaping status: Former   Substance and Sexual Activity    Alcohol use: Yes     Comment: occassional    Drug use: No    Sexual activity: Yes     Partners: Female        Objective   /78 (BP Location: Left arm, Patient Position: Sitting, Cuff Size: Large)   Pulse 87   Temp 97.6 °F (36.4 °C) (Tympanic)   Resp 16   Ht 5' 9\" (1.753 m)   Wt 104 kg (230 lb)   SpO2 96%   BMI 33.97 kg/m²      Physical Exam  Vitals and nursing note reviewed.   Constitutional:       General: He is not in acute distress.     Appearance: Normal appearance. He is well-developed. He is not ill-appearing.   HENT:      Head: Normocephalic.      Right Ear: External ear normal.      Left Ear: External ear normal.      Mouth/Throat:      Pharynx: Oropharynx is clear.   Eyes:      General:         Right eye: No discharge.         Left eye: No discharge.      Extraocular Movements: Extraocular movements intact.      Conjunctiva/sclera: Conjunctivae normal.   Cardiovascular:      Rate and Rhythm: Normal rate.   Pulmonary:      Effort: Pulmonary effort is normal. No respiratory distress.   Abdominal:      General: There is no distension.      Palpations: Abdomen is soft. There is no mass.      Tenderness: There is abdominal tenderness.      Hernia: A hernia is present.   Musculoskeletal:         General: Normal range of " motion.      Cervical back: Normal range of motion.   Skin:     General: Skin is warm.   Neurological:      General: No focal deficit present.      Mental Status: He is alert and oriented to person, place, and time.   Psychiatric:         Mood and Affect: Mood normal.           Radiology Results Review: I personally reviewed the following image studies in PACS and associated radiology reports: CT abdomen/pelvis. My interpretation of the radiology images/reports is: umbilical hernia containing omentum likely..

## 2025-03-21 ENCOUNTER — ANESTHESIA EVENT (OUTPATIENT)
Dept: PERIOP | Facility: HOSPITAL | Age: 40
End: 2025-03-21
Payer: COMMERCIAL

## 2025-03-21 NOTE — PRE-PROCEDURE INSTRUCTIONS
Pre-Surgery Instructions:   Medication Instructions    Zepbound 5 MG/0.5ML auto-injector Stop taking 7 days prior to surgery.    Medication instructions for day of surgery reviewed. Please take all instructed medications with only a sip of water.       You will receive a call one business day prior to surgery with an arrival time and hospital directions. If your surgery is scheduled on a Monday, the hospital will be calling you on the Friday prior to your surgery. If you have not heard from anyone by 8pm, please call the hospital supervisor through the hospital  at [phone]. ([place] [phone] or [place] [phone]).    Do not eat or drink anything after midnight the night before your surgery, including candy, mints, lifesavers, or chewing gum. Do not drink alcohol 24hrs before your surgery. Try not to smoke at least 24hrs before your surgery.       Follow the pre surgery showering instructions as listed in the “My Surgical Experience Booklet” or otherwise provided by your surgeon's office. Do not use a blade to shave the surgical area 1 week before surgery. It is okay to use a clean electric clippers up to 24 hours before surgery. Do not apply any lotions, creams, including makeup, cologne, deodorant, or perfumes after showering on the day of your surgery. Do not use dry shampoo, hair spray, hair gel, or any type of hair products.     No contact lenses, eye make-up, or artificial eyelashes. Remove nail polish, including gel polish, and any artificial, gel, or acrylic nails if possible. Remove all jewelry including rings and body piercing jewelry.     Wear causal clothing that is easy to take on and off. Consider your type of surgery.    Keep any valuables, jewelry, piercings at home. Please bring any specially ordered equipment (sling, braces) if indicated.    Arrange for a responsible person to drive you to and from the hospital on the day of your surgery. Please confirm the visitor policy for  the day of your procedure when you receive your phone call with an arrival time.     Call the surgeon's office with any new illnesses, exposures, or additional questions prior to surgery.    Please reference your “My Surgical Experience Booklet” for additional information to prepare for your upcoming surgery.

## 2025-04-02 ENCOUNTER — HOSPITAL ENCOUNTER (OUTPATIENT)
Facility: HOSPITAL | Age: 40
Setting detail: OUTPATIENT SURGERY
Discharge: HOME | End: 2025-04-02
Attending: STUDENT IN AN ORGANIZED HEALTH CARE EDUCATION/TRAINING PROGRAM | Admitting: STUDENT IN AN ORGANIZED HEALTH CARE EDUCATION/TRAINING PROGRAM
Payer: COMMERCIAL

## 2025-04-02 ENCOUNTER — ANESTHESIA (OUTPATIENT)
Dept: PERIOP | Facility: HOSPITAL | Age: 40
End: 2025-04-02
Payer: COMMERCIAL

## 2025-04-02 VITALS
HEART RATE: 70 BPM | SYSTOLIC BLOOD PRESSURE: 141 MMHG | OXYGEN SATURATION: 92 % | HEIGHT: 69 IN | BODY MASS INDEX: 33.7 KG/M2 | RESPIRATION RATE: 18 BRPM | TEMPERATURE: 98 F | WEIGHT: 227.51 LBS | DIASTOLIC BLOOD PRESSURE: 92 MMHG

## 2025-04-02 DIAGNOSIS — K42.9 UMBILICAL HERNIA: ICD-10-CM

## 2025-04-02 PROBLEM — E11.9 DIABETES MELLITUS, TYPE 2 (HCC): Status: ACTIVE | Noted: 2025-04-02

## 2025-04-02 LAB — GLUCOSE SERPL-MCNC: 126 MG/DL (ref 65–140)

## 2025-04-02 PROCEDURE — S2900 ROBOTIC SURGICAL SYSTEM: HCPCS | Performed by: STUDENT IN AN ORGANIZED HEALTH CARE EDUCATION/TRAINING PROGRAM

## 2025-04-02 PROCEDURE — 82948 REAGENT STRIP/BLOOD GLUCOSE: CPT

## 2025-04-02 PROCEDURE — 49594 RPR AA HRN 1ST 3-10 NCR/STRN: CPT | Performed by: STUDENT IN AN ORGANIZED HEALTH CARE EDUCATION/TRAINING PROGRAM

## 2025-04-02 PROCEDURE — 49594 RPR AA HRN 1ST 3-10 NCR/STRN: CPT | Performed by: PHYSICIAN ASSISTANT

## 2025-04-02 PROCEDURE — C1781 MESH (IMPLANTABLE): HCPCS | Performed by: STUDENT IN AN ORGANIZED HEALTH CARE EDUCATION/TRAINING PROGRAM

## 2025-04-02 DEVICE — PHASIX ST MESH WITH ECHO 2 POSITIONING SYSTEM, 11 CM (4.5"), CIRCLE
Type: IMPLANTABLE DEVICE | Site: ABDOMEN | Status: FUNCTIONAL
Brand: PHASIX

## 2025-04-02 RX ORDER — MIDAZOLAM HYDROCHLORIDE 2 MG/2ML
INJECTION, SOLUTION INTRAMUSCULAR; INTRAVENOUS AS NEEDED
Status: DISCONTINUED | OUTPATIENT
Start: 2025-04-02 | End: 2025-04-02

## 2025-04-02 RX ORDER — CEFAZOLIN SODIUM 2 G/50ML
2000 SOLUTION INTRAVENOUS ONCE
Status: COMPLETED | OUTPATIENT
Start: 2025-04-02 | End: 2025-04-02

## 2025-04-02 RX ORDER — FENTANYL CITRATE 50 UG/ML
INJECTION, SOLUTION INTRAMUSCULAR; INTRAVENOUS AS NEEDED
Status: DISCONTINUED | OUTPATIENT
Start: 2025-04-02 | End: 2025-04-02

## 2025-04-02 RX ORDER — SODIUM CHLORIDE, SODIUM LACTATE, POTASSIUM CHLORIDE, CALCIUM CHLORIDE 600; 310; 30; 20 MG/100ML; MG/100ML; MG/100ML; MG/100ML
125 INJECTION, SOLUTION INTRAVENOUS CONTINUOUS
Status: DISCONTINUED | OUTPATIENT
Start: 2025-04-02 | End: 2025-04-02 | Stop reason: HOSPADM

## 2025-04-02 RX ORDER — HYDROMORPHONE HYDROCHLORIDE 2 MG/ML
INJECTION, SOLUTION INTRAMUSCULAR; INTRAVENOUS; SUBCUTANEOUS AS NEEDED
Status: DISCONTINUED | OUTPATIENT
Start: 2025-04-02 | End: 2025-04-02

## 2025-04-02 RX ORDER — BUPIVACAINE HYDROCHLORIDE 2.5 MG/ML
INJECTION, SOLUTION EPIDURAL; INFILTRATION; INTRACAUDAL; PERINEURAL AS NEEDED
Status: DISCONTINUED | OUTPATIENT
Start: 2025-04-02 | End: 2025-04-02 | Stop reason: HOSPADM

## 2025-04-02 RX ORDER — MAGNESIUM HYDROXIDE 1200 MG/15ML
LIQUID ORAL AS NEEDED
Status: DISCONTINUED | OUTPATIENT
Start: 2025-04-02 | End: 2025-04-02 | Stop reason: HOSPADM

## 2025-04-02 RX ORDER — ROCURONIUM BROMIDE 10 MG/ML
INJECTION, SOLUTION INTRAVENOUS AS NEEDED
Status: DISCONTINUED | OUTPATIENT
Start: 2025-04-02 | End: 2025-04-02

## 2025-04-02 RX ORDER — ALBUTEROL SULFATE 0.83 MG/ML
2.5 SOLUTION RESPIRATORY (INHALATION) ONCE AS NEEDED
Status: DISCONTINUED | OUTPATIENT
Start: 2025-04-02 | End: 2025-04-02 | Stop reason: HOSPADM

## 2025-04-02 RX ORDER — OXYCODONE HYDROCHLORIDE 5 MG/1
5 TABLET ORAL EVERY 4 HOURS PRN
Status: DISCONTINUED | OUTPATIENT
Start: 2025-04-02 | End: 2025-04-02 | Stop reason: HOSPADM

## 2025-04-02 RX ORDER — HYDROMORPHONE HCL/PF 1 MG/ML
0.5 SYRINGE (ML) INJECTION
Status: DISCONTINUED | OUTPATIENT
Start: 2025-04-02 | End: 2025-04-02 | Stop reason: HOSPADM

## 2025-04-02 RX ORDER — OXYCODONE HYDROCHLORIDE 10 MG/1
10 TABLET ORAL EVERY 6 HOURS PRN
Status: DISCONTINUED | OUTPATIENT
Start: 2025-04-02 | End: 2025-04-02 | Stop reason: HOSPADM

## 2025-04-02 RX ORDER — LIDOCAINE HYDROCHLORIDE 20 MG/ML
INJECTION, SOLUTION EPIDURAL; INFILTRATION; INTRACAUDAL; PERINEURAL AS NEEDED
Status: DISCONTINUED | OUTPATIENT
Start: 2025-04-02 | End: 2025-04-02

## 2025-04-02 RX ORDER — ONDANSETRON 2 MG/ML
INJECTION INTRAMUSCULAR; INTRAVENOUS AS NEEDED
Status: DISCONTINUED | OUTPATIENT
Start: 2025-04-02 | End: 2025-04-02

## 2025-04-02 RX ORDER — FENTANYL CITRATE/PF 50 MCG/ML
25 SYRINGE (ML) INJECTION
Status: DISCONTINUED | OUTPATIENT
Start: 2025-04-02 | End: 2025-04-02 | Stop reason: HOSPADM

## 2025-04-02 RX ORDER — ACETAMINOPHEN 325 MG/1
650 TABLET ORAL EVERY 4 HOURS PRN
Status: DISCONTINUED | OUTPATIENT
Start: 2025-04-02 | End: 2025-04-02 | Stop reason: HOSPADM

## 2025-04-02 RX ORDER — PROMETHAZINE HYDROCHLORIDE 25 MG/ML
12.5 INJECTION, SOLUTION INTRAMUSCULAR; INTRAVENOUS ONCE AS NEEDED
Status: DISCONTINUED | OUTPATIENT
Start: 2025-04-02 | End: 2025-04-02 | Stop reason: HOSPADM

## 2025-04-02 RX ORDER — OXYCODONE AND ACETAMINOPHEN 5; 325 MG/1; MG/1
1 TABLET ORAL EVERY 4 HOURS PRN
Qty: 6 TABLET | Refills: 0 | Status: SHIPPED | OUTPATIENT
Start: 2025-04-02 | End: 2025-04-05

## 2025-04-02 RX ORDER — ONDANSETRON 2 MG/ML
4 INJECTION INTRAMUSCULAR; INTRAVENOUS EVERY 6 HOURS PRN
Status: DISCONTINUED | OUTPATIENT
Start: 2025-04-02 | End: 2025-04-02 | Stop reason: HOSPADM

## 2025-04-02 RX ORDER — ONDANSETRON 2 MG/ML
4 INJECTION INTRAMUSCULAR; INTRAVENOUS ONCE AS NEEDED
Status: DISCONTINUED | OUTPATIENT
Start: 2025-04-02 | End: 2025-04-02 | Stop reason: HOSPADM

## 2025-04-02 RX ORDER — PROPOFOL 10 MG/ML
INJECTION, EMULSION INTRAVENOUS AS NEEDED
Status: DISCONTINUED | OUTPATIENT
Start: 2025-04-02 | End: 2025-04-02

## 2025-04-02 RX ADMIN — HYDROMORPHONE HYDROCHLORIDE 0.5 MG: 2 INJECTION INTRAMUSCULAR; INTRAVENOUS; SUBCUTANEOUS at 16:09

## 2025-04-02 RX ADMIN — ONDANSETRON 4 MG: 2 INJECTION INTRAMUSCULAR; INTRAVENOUS at 16:03

## 2025-04-02 RX ADMIN — CEFAZOLIN SODIUM 2000 MG: 2 SOLUTION INTRAVENOUS at 13:48

## 2025-04-02 RX ADMIN — MIDAZOLAM 2 MG: 1 INJECTION INTRAMUSCULAR; INTRAVENOUS at 13:48

## 2025-04-02 RX ADMIN — HYDROMORPHONE HYDROCHLORIDE 0.5 MG: 2 INJECTION INTRAMUSCULAR; INTRAVENOUS; SUBCUTANEOUS at 14:36

## 2025-04-02 RX ADMIN — SODIUM CHLORIDE, SODIUM LACTATE, POTASSIUM CHLORIDE, AND CALCIUM CHLORIDE: .6; .31; .03; .02 INJECTION, SOLUTION INTRAVENOUS at 15:00

## 2025-04-02 RX ADMIN — OXYCODONE 5 MG: 5 TABLET ORAL at 18:28

## 2025-04-02 RX ADMIN — OXYCODONE 5 MG: 5 TABLET ORAL at 17:55

## 2025-04-02 RX ADMIN — FENTANYL CITRATE 25 MCG: 50 INJECTION INTRAMUSCULAR; INTRAVENOUS at 16:37

## 2025-04-02 RX ADMIN — PROPOFOL 250 MG: 10 INJECTION, EMULSION INTRAVENOUS at 13:51

## 2025-04-02 RX ADMIN — SUGAMMADEX 200 MG: 100 INJECTION, SOLUTION INTRAVENOUS at 16:05

## 2025-04-02 RX ADMIN — FENTANYL CITRATE 25 MCG: 50 INJECTION INTRAMUSCULAR; INTRAVENOUS at 16:55

## 2025-04-02 RX ADMIN — ROCURONIUM 100 MG: 50 INJECTION, SOLUTION INTRAVENOUS at 13:51

## 2025-04-02 RX ADMIN — LIDOCAINE HYDROCHLORIDE 100 MG: 20 INJECTION, SOLUTION EPIDURAL; INFILTRATION; INTRACAUDAL at 13:51

## 2025-04-02 RX ADMIN — SODIUM CHLORIDE, SODIUM LACTATE, POTASSIUM CHLORIDE, AND CALCIUM CHLORIDE 125 ML/HR: .6; .31; .03; .02 INJECTION, SOLUTION INTRAVENOUS at 10:56

## 2025-04-02 RX ADMIN — FENTANYL CITRATE 100 MCG: 50 INJECTION INTRAMUSCULAR; INTRAVENOUS at 13:48

## 2025-04-02 NOTE — ANESTHESIA PREPROCEDURE EVALUATION
Procedure:  REPAIR HERNIA UMBILICAL LAPAROSCOPIC W/ ROBOTICS (Abdomen)    Relevant Problems   ANESTHESIA (within normal limits)      CARDIO (within normal limits)      ENDO   (+) Diabetes mellitus, type 2 (HCC)      GI/HEPATIC   (+) Rectal bleeding      HEMATOLOGY (within normal limits)      PULMONARY (within normal limits)        Physical Exam    Airway    Mallampati score: I  TM Distance: >3 FB  Neck ROM: full     Dental   No notable dental hx     Cardiovascular  Cardiovascular exam normal    Pulmonary  Pulmonary exam normal     Other Findings        Anesthesia Plan  ASA Score- 2     Anesthesia Type- general with ASA Monitors.         Additional Monitors:     Airway Plan: ETT.           Plan Factors-    Chart reviewed.    Patient summary reviewed.                  Induction- intravenous.    Postoperative Plan-         Informed Consent- Anesthetic plan and risks discussed with patient.        NPO Status:  Vitals Value Taken Time   Date of last liquid 04/01/25 04/02/25 1032   Time of last liquid 2100 04/02/25 1032   Date of last solid 04/01/25 04/02/25 1032   Time of last solid 1800 04/02/25 1032

## 2025-04-02 NOTE — OP NOTE
OPERATIVE REPORT  PATIENT NAME: Eliud Castro    :  1985  MRN: [id number]  Pt Location: [facility] OR ROOM 08    SURGERY DATE: 2025    Surgeons and Role:     * Michelet Simeon, DO - Primary     * Isabel Hendricks PA-C - Assisting     * Marcio Wu PA-C - Assisting    Preop Diagnosis:  Umbilical hernia [K42.9]    Post-Op Diagnosis Codes:     * Umbilical hernia [K42.9]    Procedure(s):  REPAIR HERNIA UMBILICAL LAPAROSCOPIC W/ ROBOTICS    Specimen(s):  * No specimens in log *    Estimated Blood Loss:   Minimal    Drains:  * No LDAs found *    Anesthesia Type:   General    Operative Indications:  Umbilical hernia [K42.9]      Operative Findings:  Incarcerated omentum in umbilical hernia    Prior to opening the fascia, or reducing the contents of the hernia, the hernia defect was measured. The defect measured 4 cm by 4 cm. This was repaired as described in the body of the report below.      Complications:   None    Procedure and Technique:  The patient was seen in the Holding Room. The risks, benefits, complications, treatment options, and expected outcomes were discussed with the patient. The possibilities of reaction to medication, pulmonary aspiration, perforation of viscus, bleeding, recurrent infection, the need for additional procedures, failure to diagnose a condition, and creating a complication requiring transfusion or operation were discussed with the patient. The patient concurred with the proposed plan, giving informed consent.  The site of surgery properly noted/marked. The patient was taken to Operating Room, identified as Eliud Castro. Staff confirmed patient name, , and procedure. A Time Out was held and the above information confirmed.    The patient was placed supine.  General anesthesia was induced.  The patient was prepped and draped in sterile fashion.    A left upper quadrant incision was made and Veress needle was used to gain access to peritoneal cavity after confirming safe entry  with saline drop test.  A pneumoperitoneum created and the camera inserted.  There was no intra-abdominal injury or bleeding.  Additional trocars were placed with the camera being placed as far lateral as possible and the 2nd robotic arm over the ASIS. Instruments placed and the robot was docked.    Any adhesions to the intra-abdominal wall were carefully lysed using a combination of blunt dissection, scissor dissection, and  or cautery where appropriate.  Care was taken not to injure the bowel.  The bowel was carefully inspected, and preserved.     Preperitoneal pocket was made starting on ipsilateral side and this pocket was made big enough to accommodate mesh. The defect was measured to be 4 x 4 cm and contained omentum that was incarcerated.   The fascia was closed with a stratafix suture. A 11 cm circular phasix mesh was selected to fit the defect with at least 3-5 cm of overlap which was placed in peritoneal cavity. The mesh was positioned and secured to abdominal wall using 3-0 vicryl sutures. The peritoneum was closed using V loc suture.      The abdomen was reinspected for hemostasis.  There was no evidence of bleeding or bowel injury. The robot was undocked. The larger port site was closed using an 0 Vicryl suture using a suture passer and the pneumoperitoneum was released.      All skin incisions were closed using 4-0 Monocryl subcuticular sutures and surgical glue applied.    Instrument, sponge, and needle counts were correct prior to closure and at the conclusion of the case.      I was present for the entire procedure. and A physician assistant was required during the procedure for retraction, tissue handling, dissection and suturing.    Patient Disposition:  PACU              SIGNATURE: Michelet Simeon DO  DATE: April 2, 2025  TIME: 3:59 PM

## 2025-04-02 NOTE — ANESTHESIA POSTPROCEDURE EVALUATION
Post-Op Assessment Note    CV Status:  Stable  Pain Score: 0    Pain management: adequate       Mental Status:  Alert and awake   Hydration Status:  Euvolemic   PONV Controlled:  Controlled   Airway Patency:  Patent  Airway: intubated     Post Op Vitals Reviewed: Yes    No anethesia notable event occurred.            Last Filed PACU Vitals:  Vitals Value Taken Time   Temp 98.0F    Pulse 70    /92    Resp 18    SpO2 92% on RA        Modified Suri:     Vitals Value Taken Time   Activity 2 04/02/25 1737   Respiration 2 04/02/25 1737   Circulation 2 04/02/25 1737   Consciousness 2 04/02/25 1737   Oxygen Saturation 2 04/02/25 1737     Modified Suri Score: 10

## 2025-04-02 NOTE — DISCHARGE INSTR - AVS FIRST PAGE
Acute Care Surgery Discharge Instructions    Please follow-up as instructed. If you do not already have a follow-up appointment, please call the office when you leave to schedule an appointment to be seen in 2-3 weeks for post-operative re-evaluation.    Activity:  - No lifting greater than 20 pounds or strenuous physical activity or exercise for 2 weeks.  - Walking and normal light activities are encouraged.  - Normal daily activities including climbing steps are okay.  - No driving until no longer using pain medications.    Return to work:    - You may return to work as soon as you are feeling well enough.    Diet:    - You may resume your normal diet.    Wound Care:  - May shower daily. No tub baths or swimming until cleared by your surgeon.  - Wash incision gently with soap and water and pat dry.  - Do not apply any creams or ointments unless instructed to do so by your surgeon.  - You may apply ice as needed (no longer than 20 minutes at a time) for the first 48 hours.  - Bruising is not unusual and will go away with a little time. You may apply a warm, moist compress that may help the bruising resolve quicker.  - You may remove the dressings the day after surgery (unless otherwise instructed). Leave any skin tapes (steri-strips) on the incision(s) in place until they fall off on their own. Any new dressings are optional.    Medications:    - You may resume all of your regular medications after discharge unless otherwise instructed. Please refer to your discharge medication list for further details.  - Please take the pain medications as directed.  - You are encouraged to use non-narcotic pain medications first and whenever possible. Reserve the use of narcotic pain medication for moderate to severe pain not controlled by non-narcotic medications.  - No driving while taking narcotic pain medications.  - You may become constipated, especially if taking pain medications. You may take any over the counter stool  softeners or laxatives as needed. Examples: Milk of Magnesia, Colace, Senna.    Additional Instructions:  - If you have any questions or concerns after discharge please call the office.  - Call office or return to ER if fever greater than 101, chills, persistent nausea/vomiting, worsening/uncontrollable pain, and/or increasing redness or purulent/foul smelling drainage from incision(s).

## 2025-04-10 NOTE — PROGRESS NOTES
Assessment/Plan:   Eliud Castro is a 40 y.o.male who comes in today for postoperative check after robotic umbilical hernia repair with phasix mesh with Dr. Simeon on 04/02/2025.    Pathology: None sent    Patient is very pleased with the outcome of their surgery.     Postoperative restrictions reviewed, including specific lifting and exercise restrictions. All questions answered.     ______________________________________________________  HPI:  Eliud Castro is a 40 y.o.male who comes in today for postoperative check after recent robotic umbilical hernia repair with phasix mesh with Dr. Simeon on 04/02/2025.    Currently doing well without problems, no fever or chills,no nausea and no vomiting. No chest pain or trouble breathing. Normal bowel movements.      ROS:  General ROS: negative for - chills, fatigue, fever or night sweats, weight loss  Respiratory ROS: no cough, shortness of breath, or wheezing  Cardiovascular ROS: no chest pain or dyspnea on exertion  Genito-Urinary ROS: no dysuria, trouble voiding, or hematuria  Musculoskeletal ROS: negative for - gait disturbance, joint pain or muscle pain  Neurological ROS: no TIA or stroke symptoms  GI ROS: see HPI  Skin ROS: no new rashes or lesions   Lymphatic ROS: no new adenopathy noted by pt.   GYN ROS: see HPI, no new GYN history or bleeding noted  Psy ROS: no new mental or behavioral disturbances       Patient Active Problem List   Diagnosis    Rectal bleeding    Other constipation    Umbilical hernia    Diabetes mellitus, type 2 (HCC)         Patient has no known allergies.      Current Outpatient Medications:     Zepbound 5 MG/0.5ML auto-injector, 1 SUBCUTANEOUSLY ONCE A WEEK, Disp: , Rfl:     Past Medical History:   Diagnosis Date    Pre-diabetes     Umbilical hernia     surgical repair today 4/2/2025       Past Surgical History:   Procedure Laterality Date    ELBOW SURGERY Right     epicondylitis    [facility] RPR AA HERNIA 1ST < 3 CM REDUCIBLE N/A 4/2/2025     Procedure: REPAIR HERNIA UMBILICAL LAPAROSCOPIC W/ ROBOTICS;  Surgeon: Michelet Simeon DO;  Location: [facility];  Service: General       No family history on file.     reports that he has never smoked. He has never been exposed to tobacco smoke. He has never used smokeless tobacco. He reports that he does not currently use alcohol. He reports that he does not use drugs.    There were no vitals filed for this visit.    PHYSICAL EXAM  General: normal, cooperative, no distress  Abdominal: soft, nondistended, or nontender  Incision: clean, dry, and intact and healing well      CELESTE Valera    Date: 4/10/2025 Time: 9:12 AM

## 2025-04-16 ENCOUNTER — OFFICE VISIT (OUTPATIENT)
Dept: SURGERY | Facility: CLINIC | Age: 40
End: 2025-04-16

## 2025-04-16 VITALS
HEART RATE: 82 BPM | WEIGHT: 219 LBS | DIASTOLIC BLOOD PRESSURE: 82 MMHG | SYSTOLIC BLOOD PRESSURE: 116 MMHG | OXYGEN SATURATION: 96 % | HEIGHT: 69 IN | TEMPERATURE: 97.2 F | RESPIRATION RATE: 16 BRPM | BODY MASS INDEX: 32.44 KG/M2

## 2025-04-16 DIAGNOSIS — Z09 S/P UMBILICAL HERNIA REPAIR, FOLLOW-UP EXAM: Primary | ICD-10-CM

## 2025-04-16 RX ORDER — TIRZEPATIDE 7.5 MG/.5ML
7.5 INJECTION, SOLUTION SUBCUTANEOUS WEEKLY
COMMUNITY
Start: 2025-04-02

## 2025-04-23 ENCOUNTER — APPOINTMENT (OUTPATIENT)
Dept: URGENT CARE | Age: 40
End: 2025-04-23

## (undated) DEVICE — INTENDED FOR TISSUE SEPARATION, AND OTHER PROCEDURES THAT REQUIRE A SHARP SURGICAL BLADE TO PUNCTURE OR CUT.: Brand: BARD-PARKER SAFETY BLADES SIZE 15, STERILE

## (undated) DEVICE — COLUMN DRAPE

## (undated) DEVICE — TIP COVER ACCESSORY

## (undated) DEVICE — ALLENTOWN LAP CHOLE APP PACK: Brand: CARDINAL HEALTH

## (undated) DEVICE — MEGA SUTURECUT ND: Brand: ENDOWRIST

## (undated) DEVICE — 2, DISPOSABLE SUCTION/IRRIGATOR WITHOUT DISPOSABLE TIP: Brand: STRYKEFLOW

## (undated) DEVICE — SCD SEQUENTIAL COMPRESSION COMFORT SLEEVE MEDIUM KNEE LENGTH: Brand: KENDALL SCD

## (undated) DEVICE — MONOPOLAR CURVED SCISSORS: Brand: ENDOWRIST

## (undated) DEVICE — GLOVE INDICATOR PI UNDERGLOVE SZ 7.5 BLUE

## (undated) DEVICE — SUT STRATAFIX SPIRAL PDS PLUS 1  CT-1 18 IN SXPP1A404

## (undated) DEVICE — PENCILETTE PUSH BUTTON COATED

## (undated) DEVICE — SUT MONOCRYL 4-0 PS-2 27 IN Y426H

## (undated) DEVICE — REDUCER: Brand: ENDOWRIST

## (undated) DEVICE — INTENDED FOR TISSUE SEPARATION, AND OTHER PROCEDURES THAT REQUIRE A SHARP SURGICAL BLADE TO PUNCTURE OR CUT.: Brand: BARD-PARKER SAFETY BLADES SIZE 11, STERILE

## (undated) DEVICE — ASTOUND STANDARD SURGICAL GOWN, XL: Brand: CONVERTORS

## (undated) DEVICE — PMI DISPOSABLE PUNCTURE CLOSURE DEVICE / SUTURE GRASPER: Brand: PMI

## (undated) DEVICE — DRAPE SHEET X-LG

## (undated) DEVICE — SUT VICRYL PLUS 0 UR-6 27IN VCP603H

## (undated) DEVICE — SYRINGE 10ML LL

## (undated) DEVICE — GLOVE SRG BIOGEL 7

## (undated) DEVICE — BLADELESS OBTURATOR: Brand: WECK VISTA

## (undated) DEVICE — SUT STRATAFIX SPIRAL 2-0 CT-1 30 CM SXPP1B410

## (undated) DEVICE — NEEDLE SPINAL 22G X 3.5IN  QUINCKE

## (undated) DEVICE — ARM DRAPE

## (undated) DEVICE — TUBE SET SMOKE EVAC PNEUMOCLEAR HIGH FLOW

## (undated) DEVICE — EXOFIN PRECISION PEN HIGH VISCOSITY TOPICAL SKIN ADHESIVE: Brand: EXOFIN PRECISION PEN, 1G

## (undated) DEVICE — ELECTRO LUBE IS A SINGLE PATIENT USE DEVICE THAT IS INTENDED TO BE USED ON ELECTROSURGICAL ELECTRODES TO REDUCE STICKING.: Brand: KEY SURGICAL ELECTRO LUBE

## (undated) DEVICE — STAPLER CANNULA SEAL: Brand: ENDOWRIST

## (undated) DEVICE — INSUFFLATION NEEDLE TO ESTABLISH PNEUMOPERITONEUM.: Brand: INSUFFLATION NEEDLE

## (undated) DEVICE — REM POLYHESIVE ADULT PATIENT RETURN ELECTRODE: Brand: VALLEYLAB